# Patient Record
Sex: MALE | Race: BLACK OR AFRICAN AMERICAN | NOT HISPANIC OR LATINO | URBAN - METROPOLITAN AREA
[De-identification: names, ages, dates, MRNs, and addresses within clinical notes are randomized per-mention and may not be internally consistent; named-entity substitution may affect disease eponyms.]

---

## 2018-05-23 ENCOUNTER — OUTPATIENT (OUTPATIENT)
Dept: OUTPATIENT SERVICES | Facility: HOSPITAL | Age: 53
LOS: 1 days | End: 2018-05-23
Payer: COMMERCIAL

## 2018-05-23 VITALS
SYSTOLIC BLOOD PRESSURE: 127 MMHG | TEMPERATURE: 99 F | HEART RATE: 89 BPM | DIASTOLIC BLOOD PRESSURE: 87 MMHG | WEIGHT: 259.93 LBS | HEIGHT: 67 IN | RESPIRATION RATE: 16 BRPM

## 2018-05-23 DIAGNOSIS — Z01.818 ENCOUNTER FOR OTHER PREPROCEDURAL EXAMINATION: ICD-10-CM

## 2018-05-23 DIAGNOSIS — Z98.89 OTHER SPECIFIED POSTPROCEDURAL STATES: Chronic | ICD-10-CM

## 2018-05-23 DIAGNOSIS — G47.30 SLEEP APNEA, UNSPECIFIED: ICD-10-CM

## 2018-05-23 DIAGNOSIS — M17.12 UNILATERAL PRIMARY OSTEOARTHRITIS, LEFT KNEE: ICD-10-CM

## 2018-05-23 DIAGNOSIS — E11.9 TYPE 2 DIABETES MELLITUS WITHOUT COMPLICATIONS: ICD-10-CM

## 2018-05-23 DIAGNOSIS — Z96.659 PRESENCE OF UNSPECIFIED ARTIFICIAL KNEE JOINT: Chronic | ICD-10-CM

## 2018-05-23 DIAGNOSIS — M25.562 PAIN IN LEFT KNEE: ICD-10-CM

## 2018-05-23 DIAGNOSIS — R94.31 ABNORMAL ELECTROCARDIOGRAM [ECG] [EKG]: ICD-10-CM

## 2018-05-23 DIAGNOSIS — Z09 ENCOUNTER FOR FOLLOW-UP EXAMINATION AFTER COMPLETED TREATMENT FOR CONDITIONS OTHER THAN MALIGNANT NEOPLASM: Chronic | ICD-10-CM

## 2018-05-23 LAB
ALBUMIN SERPL ELPH-MCNC: 3.9 G/DL — SIGNIFICANT CHANGE UP (ref 3.3–5)
ALP SERPL-CCNC: 78 U/L — SIGNIFICANT CHANGE UP (ref 30–120)
ALT FLD-CCNC: 57 U/L DA — SIGNIFICANT CHANGE UP (ref 10–60)
ANION GAP SERPL CALC-SCNC: 8 MMOL/L — SIGNIFICANT CHANGE UP (ref 5–17)
APTT BLD: 31.1 SEC — SIGNIFICANT CHANGE UP (ref 27.5–37.4)
AST SERPL-CCNC: 30 U/L — SIGNIFICANT CHANGE UP (ref 10–40)
BILIRUB SERPL-MCNC: 0.3 MG/DL — SIGNIFICANT CHANGE UP (ref 0.2–1.2)
BUN SERPL-MCNC: 13 MG/DL — SIGNIFICANT CHANGE UP (ref 7–23)
CALCIUM SERPL-MCNC: 10.1 MG/DL — SIGNIFICANT CHANGE UP (ref 8.4–10.5)
CHLORIDE SERPL-SCNC: 101 MMOL/L — SIGNIFICANT CHANGE UP (ref 96–108)
CO2 SERPL-SCNC: 29 MMOL/L — SIGNIFICANT CHANGE UP (ref 22–31)
CREAT SERPL-MCNC: 0.84 MG/DL — SIGNIFICANT CHANGE UP (ref 0.5–1.3)
GLUCOSE SERPL-MCNC: 128 MG/DL — HIGH (ref 70–99)
HBA1C BLD-MCNC: 8.6 % — HIGH (ref 4–5.6)
HCT VFR BLD CALC: 42.4 % — SIGNIFICANT CHANGE UP (ref 39–50)
HGB BLD-MCNC: 14.5 G/DL — SIGNIFICANT CHANGE UP (ref 13–17)
INR BLD: 1.01 RATIO — SIGNIFICANT CHANGE UP (ref 0.88–1.16)
MCHC RBC-ENTMCNC: 28.7 PG — SIGNIFICANT CHANGE UP (ref 27–34)
MCHC RBC-ENTMCNC: 34.3 GM/DL — SIGNIFICANT CHANGE UP (ref 32–36)
MCV RBC AUTO: 83.5 FL — SIGNIFICANT CHANGE UP (ref 80–100)
PLATELET # BLD AUTO: 253 K/UL — SIGNIFICANT CHANGE UP (ref 150–400)
POTASSIUM SERPL-MCNC: 4.2 MMOL/L — SIGNIFICANT CHANGE UP (ref 3.5–5.3)
POTASSIUM SERPL-SCNC: 4.2 MMOL/L — SIGNIFICANT CHANGE UP (ref 3.5–5.3)
PROT SERPL-MCNC: 8.4 G/DL — HIGH (ref 6–8.3)
PROTHROM AB SERPL-ACNC: 11 SEC — SIGNIFICANT CHANGE UP (ref 9.8–12.7)
RBC # BLD: 5.07 M/UL — SIGNIFICANT CHANGE UP (ref 4.2–5.8)
RBC # FLD: 11.8 % — SIGNIFICANT CHANGE UP (ref 10.3–14.5)
SODIUM SERPL-SCNC: 138 MMOL/L — SIGNIFICANT CHANGE UP (ref 135–145)
WBC # BLD: 5.4 K/UL — SIGNIFICANT CHANGE UP (ref 3.8–10.5)
WBC # FLD AUTO: 5.4 K/UL — SIGNIFICANT CHANGE UP (ref 3.8–10.5)

## 2018-05-23 PROCEDURE — 86900 BLOOD TYPING SEROLOGIC ABO: CPT

## 2018-05-23 PROCEDURE — G0463: CPT

## 2018-05-23 PROCEDURE — 83036 HEMOGLOBIN GLYCOSYLATED A1C: CPT

## 2018-05-23 PROCEDURE — 86901 BLOOD TYPING SEROLOGIC RH(D): CPT

## 2018-05-23 PROCEDURE — 93010 ELECTROCARDIOGRAM REPORT: CPT | Mod: NC

## 2018-05-23 PROCEDURE — 87640 STAPH A DNA AMP PROBE: CPT

## 2018-05-23 PROCEDURE — 36415 COLL VENOUS BLD VENIPUNCTURE: CPT

## 2018-05-23 PROCEDURE — 85730 THROMBOPLASTIN TIME PARTIAL: CPT

## 2018-05-23 PROCEDURE — 93005 ELECTROCARDIOGRAM TRACING: CPT

## 2018-05-23 PROCEDURE — 80053 COMPREHEN METABOLIC PANEL: CPT

## 2018-05-23 PROCEDURE — 86850 RBC ANTIBODY SCREEN: CPT

## 2018-05-23 PROCEDURE — 85610 PROTHROMBIN TIME: CPT

## 2018-05-23 PROCEDURE — 85027 COMPLETE CBC AUTOMATED: CPT

## 2018-05-23 NOTE — H&P PST ADULT - PMH
Diabetes  rarely does acucheck  100-140  HTN (hypertension)    Knee pain, right    Osteoarthritis  knees  Sleep apnea in adult  sleep study done 5 years ago.  DOES NOT USE CPAP.

## 2018-05-23 NOTE — H&P PST ADULT - ATTENDING COMMENTS
52 year old male with progressively worsening left knee pain. On exam he has pain over the medial and lateral aspects of the knee. XR show advanced medial compartment osteoarthritis and wear in the patellofemoral and lateral compartments. The patient has failed conservative management. I recommend a left total knee arthroplasty. The risks of the procedure, including bleeding, infection, stiffness, damage to muscles / vessels / nerves, pain, and need for additional surgery, were reviewed. The benefits, including decreased pain and improved function, were also reviewed. The patient understands the risks and benefits and wishes to proceed with surgery.

## 2018-05-23 NOTE — H&P PST ADULT - FAMILY HISTORY
Mother  Still living? No  Family history of diabetes mellitus, Age at diagnosis: Age Unknown     Father  Still living? No  Family history of hypertension, Age at diagnosis: Age Unknown  Family history of arrhythmia, Age at diagnosis: Age Unknown

## 2018-05-23 NOTE — H&P PST ADULT - PSH
S/P knee surgery  right knee arthroscopy  S/P total knee replacement  2016, partial, right  S/P umbilical hernia repair, follow-up exam

## 2018-05-23 NOTE — H&P PST ADULT - HISTORY OF PRESENT ILLNESS
this is a 53 y/o male who has had pain left knee for several years; tests show arthritis; to have left knee replacement; to have replacement

## 2018-05-24 LAB
MRSA PCR RESULT.: SIGNIFICANT CHANGE UP
S AUREUS DNA NOSE QL NAA+PROBE: DETECTED

## 2018-05-25 RX ORDER — MUPIROCIN 20 MG/G
1 OINTMENT TOPICAL
Qty: 1 | Refills: 0 | OUTPATIENT
Start: 2018-05-25 | End: 2018-05-29

## 2018-05-25 NOTE — PROVIDER CONTACT NOTE (OTHER) - ASSESSMENT
Patient monitors his glucose and stated his levels have been well under 200.  He has made  lifestyle changes and takes glimepiride 2mg po am and 1mg po dinner, Januvia 100mgpo daily and Metformin 1000mg BID will need insulin while hospitalized

## 2018-05-25 NOTE — PROVIDER CONTACT NOTE (OTHER) - SITUATION
nose culture positive for MSSA. mupirocin ointment 2% escribed and sent to patient's pharmacy. to be used twice a day for 5 consecutive days. called patient and reviewed findings and treatment. he

## 2018-06-05 NOTE — PROVIDER CONTACT NOTE (OTHER) - BACKGROUND
Nasal culture + for MSSA    scheduled for left total knee replacement surgery on 6/12/18
Type 2 with history of hyperglycemia A1c in passed has been as high as 13%.
verbalized an understanding of the treatment protocol.

## 2018-06-05 NOTE — PROVIDER CONTACT NOTE (OTHER) - ACTION/TREATMENT ORDERED:
spoke to pt. who states he finished using Mupirocin. Verbalized that he used 2x/day for a total of 5 days. Reminded to bring checklist to hospital on day of surgery
notified [facility] op team

## 2018-06-06 RX ORDER — CHLORHEXIDINE GLUCONATE 213 G/1000ML
1 SOLUTION TOPICAL ONCE
Qty: 0 | Refills: 0 | Status: COMPLETED | OUTPATIENT
Start: 2018-06-12 | End: 2018-06-12

## 2018-06-11 ENCOUNTER — TRANSCRIPTION ENCOUNTER (OUTPATIENT)
Age: 53
End: 2018-06-11

## 2018-06-11 RX ORDER — DEXTROSE 50 % IN WATER 50 %
15 SYRINGE (ML) INTRAVENOUS ONCE
Qty: 0 | Refills: 0 | Status: DISCONTINUED | OUTPATIENT
Start: 2018-06-12 | End: 2018-06-13

## 2018-06-11 RX ORDER — INSULIN LISPRO 100/ML
VIAL (ML) SUBCUTANEOUS
Qty: 0 | Refills: 0 | Status: DISCONTINUED | OUTPATIENT
Start: 2018-06-12 | End: 2018-06-13

## 2018-06-11 RX ORDER — SODIUM CHLORIDE 9 MG/ML
1000 INJECTION, SOLUTION INTRAVENOUS
Qty: 0 | Refills: 0 | Status: DISCONTINUED | OUTPATIENT
Start: 2018-06-12 | End: 2018-06-13

## 2018-06-11 RX ORDER — MAGNESIUM HYDROXIDE 400 MG/1
30 TABLET, CHEWABLE ORAL DAILY
Qty: 0 | Refills: 0 | Status: DISCONTINUED | OUTPATIENT
Start: 2018-06-12 | End: 2018-06-13

## 2018-06-11 RX ORDER — DEXTROSE 50 % IN WATER 50 %
25 SYRINGE (ML) INTRAVENOUS ONCE
Qty: 0 | Refills: 0 | Status: DISCONTINUED | OUTPATIENT
Start: 2018-06-12 | End: 2018-06-13

## 2018-06-11 RX ORDER — ONDANSETRON 8 MG/1
4 TABLET, FILM COATED ORAL EVERY 6 HOURS
Qty: 0 | Refills: 0 | Status: DISCONTINUED | OUTPATIENT
Start: 2018-06-12 | End: 2018-06-13

## 2018-06-11 RX ORDER — GLUCAGON INJECTION, SOLUTION 0.5 MG/.1ML
1 INJECTION, SOLUTION SUBCUTANEOUS ONCE
Qty: 0 | Refills: 0 | Status: DISCONTINUED | OUTPATIENT
Start: 2018-06-12 | End: 2018-06-13

## 2018-06-11 RX ORDER — POLYETHYLENE GLYCOL 3350 17 G/17G
17 POWDER, FOR SOLUTION ORAL DAILY
Qty: 0 | Refills: 0 | Status: DISCONTINUED | OUTPATIENT
Start: 2018-06-12 | End: 2018-06-13

## 2018-06-11 RX ORDER — DOCUSATE SODIUM 100 MG
100 CAPSULE ORAL THREE TIMES A DAY
Qty: 0 | Refills: 0 | Status: DISCONTINUED | OUTPATIENT
Start: 2018-06-12 | End: 2018-06-13

## 2018-06-11 RX ORDER — DEXTROSE 50 % IN WATER 50 %
12.5 SYRINGE (ML) INTRAVENOUS ONCE
Qty: 0 | Refills: 0 | Status: DISCONTINUED | OUTPATIENT
Start: 2018-06-12 | End: 2018-06-13

## 2018-06-11 RX ORDER — SENNA PLUS 8.6 MG/1
2 TABLET ORAL AT BEDTIME
Qty: 0 | Refills: 0 | Status: DISCONTINUED | OUTPATIENT
Start: 2018-06-12 | End: 2018-06-13

## 2018-06-11 RX ORDER — PANTOPRAZOLE SODIUM 20 MG/1
40 TABLET, DELAYED RELEASE ORAL DAILY
Qty: 0 | Refills: 0 | Status: DISCONTINUED | OUTPATIENT
Start: 2018-06-12 | End: 2018-06-13

## 2018-06-11 NOTE — PATIENT PROFILE ADULT. - TEACHING/LEARNING LEARNING PREFERENCES
pictorial/skill demonstration/written material/verbal instruction/group instruction/individual instruction/video

## 2018-06-12 ENCOUNTER — RESULT REVIEW (OUTPATIENT)
Age: 53
End: 2018-06-12

## 2018-06-12 ENCOUNTER — INPATIENT (INPATIENT)
Facility: HOSPITAL | Age: 53
LOS: 0 days | Discharge: HOME | DRG: 470 | End: 2018-06-13
Attending: ORTHOPAEDIC SURGERY | Admitting: ORTHOPAEDIC SURGERY
Payer: COMMERCIAL

## 2018-06-12 VITALS — WEIGHT: 253.09 LBS | HEIGHT: 66 IN

## 2018-06-12 DIAGNOSIS — Z01.818 ENCOUNTER FOR OTHER PREPROCEDURAL EXAMINATION: ICD-10-CM

## 2018-06-12 DIAGNOSIS — Z09 ENCOUNTER FOR FOLLOW-UP EXAMINATION AFTER COMPLETED TREATMENT FOR CONDITIONS OTHER THAN MALIGNANT NEOPLASM: Chronic | ICD-10-CM

## 2018-06-12 DIAGNOSIS — M17.12 UNILATERAL PRIMARY OSTEOARTHRITIS, LEFT KNEE: ICD-10-CM

## 2018-06-12 DIAGNOSIS — G47.30 SLEEP APNEA, UNSPECIFIED: ICD-10-CM

## 2018-06-12 DIAGNOSIS — Z96.659 PRESENCE OF UNSPECIFIED ARTIFICIAL KNEE JOINT: Chronic | ICD-10-CM

## 2018-06-12 DIAGNOSIS — Z98.89 OTHER SPECIFIED POSTPROCEDURAL STATES: Chronic | ICD-10-CM

## 2018-06-12 DIAGNOSIS — E11.9 TYPE 2 DIABETES MELLITUS WITHOUT COMPLICATIONS: ICD-10-CM

## 2018-06-12 DIAGNOSIS — I10 ESSENTIAL (PRIMARY) HYPERTENSION: ICD-10-CM

## 2018-06-12 LAB
ANION GAP SERPL CALC-SCNC: 12 MMOL/L — SIGNIFICANT CHANGE UP (ref 5–17)
BUN SERPL-MCNC: 12 MG/DL — SIGNIFICANT CHANGE UP (ref 7–23)
CALCIUM SERPL-MCNC: 9.6 MG/DL — SIGNIFICANT CHANGE UP (ref 8.4–10.5)
CHLORIDE SERPL-SCNC: 98 MMOL/L — SIGNIFICANT CHANGE UP (ref 96–108)
CO2 SERPL-SCNC: 24 MMOL/L — SIGNIFICANT CHANGE UP (ref 22–31)
CREAT SERPL-MCNC: 0.97 MG/DL — SIGNIFICANT CHANGE UP (ref 0.5–1.3)
GLUCOSE SERPL-MCNC: 203 MG/DL — HIGH (ref 70–99)
HCT VFR BLD CALC: 37.3 % — LOW (ref 39–50)
HGB BLD-MCNC: 12.6 G/DL — LOW (ref 13–17)
POTASSIUM SERPL-MCNC: 4.2 MMOL/L — SIGNIFICANT CHANGE UP (ref 3.5–5.3)
POTASSIUM SERPL-SCNC: 4.2 MMOL/L — SIGNIFICANT CHANGE UP (ref 3.5–5.3)
SODIUM SERPL-SCNC: 134 MMOL/L — LOW (ref 135–145)

## 2018-06-12 PROCEDURE — 88311 DECALCIFY TISSUE: CPT | Mod: 26

## 2018-06-12 PROCEDURE — 73562 X-RAY EXAM OF KNEE 3: CPT | Mod: 26,LT

## 2018-06-12 PROCEDURE — 88305 TISSUE EXAM BY PATHOLOGIST: CPT | Mod: 26

## 2018-06-12 RX ORDER — ACETAMINOPHEN 500 MG
1000 TABLET ORAL EVERY 6 HOURS
Qty: 0 | Refills: 0 | Status: COMPLETED | OUTPATIENT
Start: 2018-06-12 | End: 2018-06-13

## 2018-06-12 RX ORDER — CELECOXIB 200 MG/1
200 CAPSULE ORAL
Qty: 0 | Refills: 0 | Status: DISCONTINUED | OUTPATIENT
Start: 2018-06-13 | End: 2018-06-13

## 2018-06-12 RX ORDER — FENTANYL CITRATE 50 UG/ML
25 INJECTION INTRAVENOUS
Qty: 0 | Refills: 0 | Status: DISCONTINUED | OUTPATIENT
Start: 2018-06-12 | End: 2018-06-12

## 2018-06-12 RX ORDER — CEFAZOLIN SODIUM 1 G
2000 VIAL (EA) INJECTION ONCE
Qty: 0 | Refills: 0 | Status: COMPLETED | OUTPATIENT
Start: 2018-06-12 | End: 2018-06-12

## 2018-06-12 RX ORDER — TRANEXAMIC ACID 100 MG/ML
1000 INJECTION, SOLUTION INTRAVENOUS ONCE
Qty: 0 | Refills: 0 | Status: COMPLETED | OUTPATIENT
Start: 2018-06-12 | End: 2018-06-12

## 2018-06-12 RX ORDER — SODIUM CHLORIDE 9 MG/ML
1000 INJECTION, SOLUTION INTRAVENOUS
Qty: 0 | Refills: 0 | Status: DISCONTINUED | OUTPATIENT
Start: 2018-06-12 | End: 2018-06-12

## 2018-06-12 RX ORDER — ASPIRIN/CALCIUM CARB/MAGNESIUM 324 MG
2 TABLET ORAL
Qty: 164 | Refills: 0 | OUTPATIENT
Start: 2018-06-12 | End: 2018-07-22

## 2018-06-12 RX ORDER — ACETAMINOPHEN 500 MG
1000 TABLET ORAL ONCE
Qty: 0 | Refills: 0 | Status: COMPLETED | OUTPATIENT
Start: 2018-06-12 | End: 2018-06-12

## 2018-06-12 RX ORDER — DIPHENHYDRAMINE HCL 50 MG
25 CAPSULE ORAL ONCE
Qty: 0 | Refills: 0 | Status: DISCONTINUED | OUTPATIENT
Start: 2018-06-12 | End: 2018-06-13

## 2018-06-12 RX ORDER — ASPIRIN/CALCIUM CARB/MAGNESIUM 324 MG
162 TABLET ORAL EVERY 12 HOURS
Qty: 0 | Refills: 0 | Status: DISCONTINUED | OUTPATIENT
Start: 2018-06-13 | End: 2018-06-13

## 2018-06-12 RX ORDER — OXYCODONE HYDROCHLORIDE 5 MG/1
5 TABLET ORAL EVERY 4 HOURS
Qty: 0 | Refills: 0 | Status: DISCONTINUED | OUTPATIENT
Start: 2018-06-12 | End: 2018-06-12

## 2018-06-12 RX ORDER — AMLODIPINE BESYLATE 2.5 MG/1
10 TABLET ORAL DAILY
Qty: 0 | Refills: 0 | Status: DISCONTINUED | OUTPATIENT
Start: 2018-06-12 | End: 2018-06-13

## 2018-06-12 RX ORDER — ACETAMINOPHEN 500 MG
1000 TABLET ORAL EVERY 8 HOURS
Qty: 0 | Refills: 0 | Status: DISCONTINUED | OUTPATIENT
Start: 2018-06-13 | End: 2018-06-13

## 2018-06-12 RX ORDER — IPRATROPIUM/ALBUTEROL SULFATE 18-103MCG
3 AEROSOL WITH ADAPTER (GRAM) INHALATION EVERY 6 HOURS
Qty: 0 | Refills: 0 | Status: DISCONTINUED | OUTPATIENT
Start: 2018-06-12 | End: 2018-06-12

## 2018-06-12 RX ORDER — METFORMIN HYDROCHLORIDE 850 MG/1
1000 TABLET ORAL
Qty: 0 | Refills: 0 | Status: DISCONTINUED | OUTPATIENT
Start: 2018-06-13 | End: 2018-06-13

## 2018-06-12 RX ORDER — HYDROMORPHONE HYDROCHLORIDE 2 MG/ML
0.5 INJECTION INTRAMUSCULAR; INTRAVENOUS; SUBCUTANEOUS
Qty: 0 | Refills: 0 | Status: DISCONTINUED | OUTPATIENT
Start: 2018-06-12 | End: 2018-06-13

## 2018-06-12 RX ORDER — OXYCODONE HYDROCHLORIDE 5 MG/1
10 TABLET ORAL
Qty: 0 | Refills: 0 | Status: DISCONTINUED | OUTPATIENT
Start: 2018-06-12 | End: 2018-06-13

## 2018-06-12 RX ORDER — ONDANSETRON 8 MG/1
4 TABLET, FILM COATED ORAL ONCE
Qty: 0 | Refills: 0 | Status: DISCONTINUED | OUTPATIENT
Start: 2018-06-12 | End: 2018-06-12

## 2018-06-12 RX ORDER — DOCUSATE SODIUM 100 MG
1 CAPSULE ORAL
Qty: 0 | Refills: 0 | COMMUNITY
Start: 2018-06-12

## 2018-06-12 RX ORDER — POLYETHYLENE GLYCOL 3350 17 G/17G
17 POWDER, FOR SOLUTION ORAL
Qty: 0 | Refills: 0 | COMMUNITY
Start: 2018-06-12

## 2018-06-12 RX ORDER — ACETAMINOPHEN 500 MG
2 TABLET ORAL
Qty: 0 | Refills: 0 | COMMUNITY
Start: 2018-06-12 | End: 2018-06-26

## 2018-06-12 RX ORDER — SENNA PLUS 8.6 MG/1
2 TABLET ORAL
Qty: 0 | Refills: 0 | COMMUNITY
Start: 2018-06-12

## 2018-06-12 RX ORDER — OXYCODONE HYDROCHLORIDE 5 MG/1
5 TABLET ORAL
Qty: 0 | Refills: 0 | Status: DISCONTINUED | OUTPATIENT
Start: 2018-06-12 | End: 2018-06-13

## 2018-06-12 RX ORDER — PANTOPRAZOLE SODIUM 20 MG/1
1 TABLET, DELAYED RELEASE ORAL
Qty: 41 | Refills: 0 | OUTPATIENT
Start: 2018-06-12 | End: 2018-07-22

## 2018-06-12 RX ORDER — CELECOXIB 200 MG/1
1 CAPSULE ORAL
Qty: 42 | Refills: 1 | OUTPATIENT
Start: 2018-06-12 | End: 2018-07-23

## 2018-06-12 RX ORDER — CEFAZOLIN SODIUM 1 G
2000 VIAL (EA) INJECTION EVERY 8 HOURS
Qty: 0 | Refills: 0 | Status: COMPLETED | OUTPATIENT
Start: 2018-06-12 | End: 2018-06-13

## 2018-06-12 RX ORDER — APREPITANT 80 MG/1
40 CAPSULE ORAL ONCE
Qty: 0 | Refills: 0 | Status: COMPLETED | OUTPATIENT
Start: 2018-06-12 | End: 2018-06-12

## 2018-06-12 RX ADMIN — Medication 100 MILLIGRAM(S): at 19:26

## 2018-06-12 RX ADMIN — Medication 1000 MILLIGRAM(S): at 22:07

## 2018-06-12 RX ADMIN — HYDROMORPHONE HYDROCHLORIDE 0.5 MILLIGRAM(S): 2 INJECTION INTRAMUSCULAR; INTRAVENOUS; SUBCUTANEOUS at 22:23

## 2018-06-12 RX ADMIN — HYDROMORPHONE HYDROCHLORIDE 0.5 MILLIGRAM(S): 2 INJECTION INTRAMUSCULAR; INTRAVENOUS; SUBCUTANEOUS at 22:53

## 2018-06-12 RX ADMIN — APREPITANT 40 MILLIGRAM(S): 80 CAPSULE ORAL at 11:43

## 2018-06-12 RX ADMIN — SODIUM CHLORIDE 50 MILLILITER(S): 9 INJECTION, SOLUTION INTRAVENOUS at 15:48

## 2018-06-12 RX ADMIN — Medication 400 MILLIGRAM(S): at 20:07

## 2018-06-12 RX ADMIN — CHLORHEXIDINE GLUCONATE 1 APPLICATION(S): 213 SOLUTION TOPICAL at 10:59

## 2018-06-12 RX ADMIN — Medication 100 MILLIGRAM(S): at 22:23

## 2018-06-12 NOTE — PHYSICAL THERAPY INITIAL EVALUATION ADULT - ADDITIONAL COMMENTS
Pt lives with family in a house w/ 3-4 steps to enter with B wide rails.  Pt has 1 flight of steps to bedroom with right rail

## 2018-06-12 NOTE — BRIEF OPERATIVE NOTE - PROCEDURE
<<-----Click on this checkbox to enter Procedure Left total knee arthroplasty  06/12/2018    Active  Smith Larsen

## 2018-06-12 NOTE — PROGRESS NOTE ADULT - SUBJECTIVE AND OBJECTIVE BOX
Orthopaedic Post Op Note    Procedure: Left TKR  Surgeon: Constantin Fletcher    52y Male comfortable, without complaints. Reported pain score = 3 Left foot "fell asleep," but is coming back.  Denies N/V, CP, SOB.    PE:  Vital Signs Last 24 Hrs  T(C): 36.7 (12 Jun 2018 18:45), Max: 37.3 (12 Jun 2018 14:57)  T(F): 98.1 (12 Jun 2018 18:45), Max: 99.1 (12 Jun 2018 14:57)  HR: 90 (12 Jun 2018 18:45) (74 - 92)  BP: 121/81 (12 Jun 2018 18:45) (98/70 - 154/55)  RR: 16 (12 Jun 2018 18:45) (12 - 24)  SpO2: 100% (12 Jun 2018 18:45) (94% - 100%)  General: Pt alert and oriented   Lungs: + BS CTA bilaterally  Heart: +S1 & S2 heard, RRR  Abd: + BS heard, soft, NT, ND  Left Knee Dressing: C/D/I, functioning hemovac in place  Bilateral LEs:  Motor:   5/5 dorsiflexion, plantarflexion, EHL  Sensation intact to LT   2+ DP Pulses    A/P: 52y Male POD#0 s/p Left TKR  - Stable  - Acetaminophen, Celebrex, Dilaudid/Oxycodone for Pain Control   - DVT ppx: Aspirin  - Mirella op IV abx: Ancef   - PT, OT per protocol  - F/U AM Labs  DCP = home Thurs

## 2018-06-12 NOTE — PHYSICAL THERAPY INITIAL EVALUATION ADULT - RANGE OF MOTION EXAMINATION, REHAB EVAL
bilateral upper extremity ROM was WFL (within functional limits)/deficits as listed below/L knee 0-60 deg

## 2018-06-12 NOTE — PHYSICAL THERAPY INITIAL EVALUATION ADULT - BED MOBILITY TRAINING, PT EVAL
Goals 3-5 sessions: pt will perform bed mobility independently.    Pt will negotiate 12 steps with right rail and straight cane with supervision

## 2018-06-13 ENCOUNTER — TRANSCRIPTION ENCOUNTER (OUTPATIENT)
Age: 53
End: 2018-06-13

## 2018-06-13 VITALS
TEMPERATURE: 98 F | DIASTOLIC BLOOD PRESSURE: 79 MMHG | OXYGEN SATURATION: 99 % | HEART RATE: 107 BPM | SYSTOLIC BLOOD PRESSURE: 127 MMHG | RESPIRATION RATE: 14 BRPM

## 2018-06-13 DIAGNOSIS — E11.8 TYPE 2 DIABETES MELLITUS WITH UNSPECIFIED COMPLICATIONS: ICD-10-CM

## 2018-06-13 DIAGNOSIS — Z29.9 ENCOUNTER FOR PROPHYLACTIC MEASURES, UNSPECIFIED: ICD-10-CM

## 2018-06-13 DIAGNOSIS — I10 ESSENTIAL (PRIMARY) HYPERTENSION: ICD-10-CM

## 2018-06-13 LAB
ANION GAP SERPL CALC-SCNC: 8 MMOL/L — SIGNIFICANT CHANGE UP (ref 5–17)
BUN SERPL-MCNC: 11 MG/DL — SIGNIFICANT CHANGE UP (ref 7–23)
CALCIUM SERPL-MCNC: 9.7 MG/DL — SIGNIFICANT CHANGE UP (ref 8.4–10.5)
CHLORIDE SERPL-SCNC: 100 MMOL/L — SIGNIFICANT CHANGE UP (ref 96–108)
CO2 SERPL-SCNC: 26 MMOL/L — SIGNIFICANT CHANGE UP (ref 22–31)
CREAT SERPL-MCNC: 0.98 MG/DL — SIGNIFICANT CHANGE UP (ref 0.5–1.3)
GLUCOSE SERPL-MCNC: 133 MG/DL — HIGH (ref 70–99)
HCT VFR BLD CALC: 34.3 % — LOW (ref 39–50)
HGB BLD-MCNC: 11.5 G/DL — LOW (ref 13–17)
MCHC RBC-ENTMCNC: 28 PG — SIGNIFICANT CHANGE UP (ref 27–34)
MCHC RBC-ENTMCNC: 33.6 GM/DL — SIGNIFICANT CHANGE UP (ref 32–36)
MCV RBC AUTO: 83.5 FL — SIGNIFICANT CHANGE UP (ref 80–100)
PLATELET # BLD AUTO: 232 K/UL — SIGNIFICANT CHANGE UP (ref 150–400)
POTASSIUM SERPL-MCNC: 4 MMOL/L — SIGNIFICANT CHANGE UP (ref 3.5–5.3)
POTASSIUM SERPL-SCNC: 4 MMOL/L — SIGNIFICANT CHANGE UP (ref 3.5–5.3)
RBC # BLD: 4.1 M/UL — LOW (ref 4.2–5.8)
RBC # FLD: 11.4 % — SIGNIFICANT CHANGE UP (ref 10.3–14.5)
SODIUM SERPL-SCNC: 134 MMOL/L — LOW (ref 135–145)
WBC # BLD: 8.5 K/UL — SIGNIFICANT CHANGE UP (ref 3.8–10.5)
WBC # FLD AUTO: 8.5 K/UL — SIGNIFICANT CHANGE UP (ref 3.8–10.5)

## 2018-06-13 PROCEDURE — 97165 OT EVAL LOW COMPLEX 30 MIN: CPT

## 2018-06-13 PROCEDURE — 82962 GLUCOSE BLOOD TEST: CPT

## 2018-06-13 PROCEDURE — 97530 THERAPEUTIC ACTIVITIES: CPT

## 2018-06-13 PROCEDURE — 94660 CPAP INITIATION&MGMT: CPT

## 2018-06-13 PROCEDURE — 80048 BASIC METABOLIC PNL TOTAL CA: CPT

## 2018-06-13 PROCEDURE — 97116 GAIT TRAINING THERAPY: CPT

## 2018-06-13 PROCEDURE — 97535 SELF CARE MNGMENT TRAINING: CPT

## 2018-06-13 PROCEDURE — 85014 HEMATOCRIT: CPT

## 2018-06-13 PROCEDURE — 94664 DEMO&/EVAL PT USE INHALER: CPT

## 2018-06-13 PROCEDURE — 88305 TISSUE EXAM BY PATHOLOGIST: CPT

## 2018-06-13 PROCEDURE — 73562 X-RAY EXAM OF KNEE 3: CPT

## 2018-06-13 PROCEDURE — C1776: CPT

## 2018-06-13 PROCEDURE — 97161 PT EVAL LOW COMPLEX 20 MIN: CPT

## 2018-06-13 PROCEDURE — 88311 DECALCIFY TISSUE: CPT

## 2018-06-13 PROCEDURE — 85018 HEMOGLOBIN: CPT

## 2018-06-13 PROCEDURE — 36415 COLL VENOUS BLD VENIPUNCTURE: CPT

## 2018-06-13 PROCEDURE — 97110 THERAPEUTIC EXERCISES: CPT

## 2018-06-13 PROCEDURE — C1713: CPT

## 2018-06-13 PROCEDURE — 85027 COMPLETE CBC AUTOMATED: CPT

## 2018-06-13 RX ORDER — OXYCODONE HYDROCHLORIDE 5 MG/1
1 TABLET ORAL
Qty: 0 | Refills: 0 | COMMUNITY
Start: 2018-06-13

## 2018-06-13 RX ORDER — GLIMEPIRIDE 1 MG
1 TABLET ORAL
Qty: 0 | Refills: 0 | COMMUNITY

## 2018-06-13 RX ORDER — SITAGLIPTIN 50 MG/1
1 TABLET, FILM COATED ORAL
Qty: 0 | Refills: 0 | COMMUNITY

## 2018-06-13 RX ORDER — OXYCODONE HYDROCHLORIDE 5 MG/1
1 TABLET ORAL
Qty: 42 | Refills: 0 | OUTPATIENT
Start: 2018-06-13 | End: 2018-06-19

## 2018-06-13 RX ORDER — METFORMIN HYDROCHLORIDE 850 MG/1
1 TABLET ORAL
Qty: 0 | Refills: 0 | COMMUNITY

## 2018-06-13 RX ORDER — AMLODIPINE BESYLATE 2.5 MG/1
1 TABLET ORAL
Qty: 0 | Refills: 0 | COMMUNITY

## 2018-06-13 RX ORDER — SENNA PLUS 8.6 MG/1
2 TABLET ORAL
Qty: 14 | Refills: 0 | OUTPATIENT
Start: 2018-06-13 | End: 2018-06-19

## 2018-06-13 RX ORDER — POLYETHYLENE GLYCOL 3350 17 G/17G
17 POWDER, FOR SOLUTION ORAL
Qty: 119 | Refills: 0 | OUTPATIENT
Start: 2018-06-13 | End: 2018-06-19

## 2018-06-13 RX ORDER — DOCUSATE SODIUM 100 MG
1 CAPSULE ORAL
Qty: 21 | Refills: 0 | OUTPATIENT
Start: 2018-06-13 | End: 2018-06-19

## 2018-06-13 RX ADMIN — CELECOXIB 200 MILLIGRAM(S): 200 CAPSULE ORAL at 16:35

## 2018-06-13 RX ADMIN — Medication 100 MILLIGRAM(S): at 04:56

## 2018-06-13 RX ADMIN — Medication 1000 MILLIGRAM(S): at 11:15

## 2018-06-13 RX ADMIN — Medication 162 MILLIGRAM(S): at 09:00

## 2018-06-13 RX ADMIN — Medication 400 MILLIGRAM(S): at 08:26

## 2018-06-13 RX ADMIN — Medication 1000 MILLIGRAM(S): at 05:55

## 2018-06-13 RX ADMIN — PANTOPRAZOLE SODIUM 40 MILLIGRAM(S): 20 TABLET, DELAYED RELEASE ORAL at 11:39

## 2018-06-13 RX ADMIN — Medication 1000 MILLIGRAM(S): at 16:35

## 2018-06-13 RX ADMIN — CELECOXIB 200 MILLIGRAM(S): 200 CAPSULE ORAL at 11:15

## 2018-06-13 RX ADMIN — METFORMIN HYDROCHLORIDE 1000 MILLIGRAM(S): 850 TABLET ORAL at 08:26

## 2018-06-13 RX ADMIN — AMLODIPINE BESYLATE 10 MILLIGRAM(S): 2.5 TABLET ORAL at 04:56

## 2018-06-13 RX ADMIN — CELECOXIB 200 MILLIGRAM(S): 200 CAPSULE ORAL at 16:36

## 2018-06-13 RX ADMIN — HYDROMORPHONE HYDROCHLORIDE 0.5 MILLIGRAM(S): 2 INJECTION INTRAMUSCULAR; INTRAVENOUS; SUBCUTANEOUS at 04:56

## 2018-06-13 RX ADMIN — Medication 100 MILLIGRAM(S): at 03:12

## 2018-06-13 RX ADMIN — Medication 100 MILLIGRAM(S): at 14:33

## 2018-06-13 RX ADMIN — HYDROMORPHONE HYDROCHLORIDE 0.5 MILLIGRAM(S): 2 INJECTION INTRAMUSCULAR; INTRAVENOUS; SUBCUTANEOUS at 05:26

## 2018-06-13 RX ADMIN — CELECOXIB 200 MILLIGRAM(S): 200 CAPSULE ORAL at 08:26

## 2018-06-13 RX ADMIN — Medication 400 MILLIGRAM(S): at 03:12

## 2018-06-13 NOTE — OCCUPATIONAL THERAPY INITIAL EVALUATION ADULT - ADDITIONAL COMMENTS
3-4 LEXI 2 wide HRs, 1 flight 1 HR to bedroom & stall shower. + 1/2 bath 1st floor. No DME Pt lives in a house with 3-4 steps to enter with 2 wide handrails. 1 flight with a handrail to bedroom & stall shower. + 1/2 bath 1st floor. Pt owns a rolling walker, cane and commode. Pt reports that his family can assist him upon d/c home prn. Pt lives in a house with 3-4 steps to enter with 2 wide handrails. 1 flight with a handrail to bedroom & stall shower. + 1/2 bath 1st floor. Pt owns a rolling walker, cane, shower chair with back and commode. Pt reports that his family can assist him upon d/c home prn.

## 2018-06-13 NOTE — DISCHARGE NOTE ADULT - PLAN OF CARE
return to activities of daily living Physical Therapy/Occupational Therapy for: ambulation, transfers, stairs, ADL's (activities of daily living), range of motion exercises, and isometrics  -Activity  • Weight Bearing as tolerated with rolling walker.  • Take short, frequent walks increasing the distance that you walk each day as tolerated.  • Change your position every hour to decrease pain and stiffness.  • Continue the exercises taught to you by your physical therapist.  • No driving until cleared by the doctor.  • No tub baths, hot tubs, or swimming pools until instructed by your doctor.  • Do not squat down on the floor.  • Do not kneel or twist your knee.  • Range of Motion Goals: Flexion= 120 degrees, Extension = 0 degrees  Keep incision clean and dry. May shower 5 days after surgery if no drainage from incision.  Suture removal 2 weeks after surgery at Surgeon's office.    Call Dr. Nelson' office [phone] to make an appointment to be seen within 7-10 days. [facility] - [address] [place] [address] - Call your doctor if you experience:  • An increase in pain not controlled by pain medication or change in activity or  position.  • Temperature greater than 101° F.  • Redness, increased swelling or foul smelling drainage from or around the  incision.  • Numbness, tingling or a change in color or temperature of the operative leg.  • Call your doctor immediately if you experience chest pain, shortness of breath or calf pain.

## 2018-06-13 NOTE — CONSULT NOTE ADULT - PROBLEM SELECTOR PROBLEM 3
HTN (hypertension)
Type 2 diabetes mellitus with complication, without long-term current use of insulin

## 2018-06-13 NOTE — DISCHARGE NOTE ADULT - CARE PROVIDER_API CALL
Constantin Fletcher), Orthopaedic Surgery  [address] [place] [address]  Phone: [phone]  Fax: [phone]

## 2018-06-13 NOTE — DISCHARGE NOTE ADULT - MEDICATION SUMMARY - MEDICATIONS TO TAKE
I will START or STAY ON the medications listed below when I get home from the hospital:    acetaminophen 500 mg oral tablet  -- 2 tab(s) by mouth every 8 hours  -- Indication: For Pain    celecoxib 200 mg oral capsule  -- 1 cap(s) by mouth 2 times a day  -- Indication: For Pain    aspirin 81 mg oral delayed release tablet  -- 2 tab(s) by mouth every 12 hours  -- Indication: For Prevent blood clots    oxyCODONE 5 mg oral tablet  -- 1 tab(s) by mouth every 4 hours, As Needed -Mild Pain (1 - 3) MDD:6  -- Indication: For Pain    metFORMIN 1000 mg oral tablet, extended release  -- 1 tab(s) by mouth 2 times a day  -- Indication: For Diabetes    Januvia 100 mg oral tablet  -- 1 tab(s) by mouth once a day  -- Indication: For Diabetes    glimepiride 2 mg oral tablet  -- 1 tab(s) by mouth once a day  -- Indication: For Diabetes    amLODIPine 10 mg oral tablet  -- 1 tab(s) by mouth once a day  -- Indication: For blood pressure    senna oral tablet  -- 2 tab(s) by mouth once a day (at bedtime) MDD:2  -- Indication: For Prevent constipation    polyethylene glycol 3350 oral powder for reconstitution  -- 17 gram(s) by mouth once a day, As needed, Constipation MDD:34  -- Indication: For Prevent constipation    docusate sodium 100 mg oral capsule  -- 1 cap(s) by mouth 3 times a day MDD:3  -- Indication: For Prevent constipation    pantoprazole 40 mg oral delayed release tablet  -- 1 tab(s) by mouth once a day  -- Indication: For Prevent constipation

## 2018-06-13 NOTE — OCCUPATIONAL THERAPY INITIAL EVALUATION ADULT - GENERAL OBSERVATIONS, REHAB EVAL
Pt found supine in bed with +IV, PAS, +bandage left knee, supplemental 02 Pt found supine in bed with +IV, +hemovac/+bandage left knee, +telemonitor, supplemental 02.

## 2018-06-13 NOTE — OCCUPATIONAL THERAPY INITIAL EVALUATION ADULT - PATIENT/FAMILY/SIGNIFICANT OTHER GOALS STATEMENT, OT EVAL
Pt. would like to go home upon d/c from [facility]. Pt. would like to go home today upon d/c from [facility]. Pt reports his family will assist him at home prn.

## 2018-06-13 NOTE — PROGRESS NOTE ADULT - SUBJECTIVE AND OBJECTIVE BOX
POD#:  1  S/P: Left  TKR                       SUBJECTIVE: Patient seen and examined.  Patient with no complaints at this time. Pain controlled. Denies any fevers, chills, numbness or weakness  Reported Pain Score = 4/10    OBJECTIVE:     Vital Signs Last 24 Hrs  T(C): 36.8 (13 Jun 2018 07:25), Max: 37.3 (12 Jun 2018 14:57)  T(F): 98.3 (13 Jun 2018 07:25), Max: 99.1 (12 Jun 2018 14:57)  HR: 97 (13 Jun 2018 07:25) (74 - 97)  BP: 133/80 (13 Jun 2018 07:25) (98/70 - 154/55)  BP(mean): --  RR: 18 (13 Jun 2018 07:25) (12 - 24)  SpO2: 100% (13 Jun 2018 07:25) (94% - 100%)    I/O- hemovac- 440cc last 12 hours    Left Knee:          Dressing: clean/dry/intact , hemovac in place   Bilateral LEs:         Sensation:  intact to light touch          Motor exam:  /5 dorsiflexion/plantarflexion/EHL          2+ DP pulses          calf supple, NT    LABS:                        11.5   8.5   )-----------( 232      ( 13 Jun 2018 07:59 )             34.3     06-13    134<L>  |  100  |  11  ----------------------------<  133<H>  4.0   |  26  |  0.98    Ca    9.7      13 Jun 2018 07:59            MEDICATIONS:  Anticoagulation:  aspirin enteric coated 162 milliGRAM(s) Oral every 12 hours      Pain medications:   acetaminophen   Tablet. 1000 milliGRAM(s) Oral every 8 hours  celecoxib 200 milliGRAM(s) Oral two times a day  diphenhydrAMINE   Capsule 25 milliGRAM(s) Oral once PRN  HYDROmorphone  Injectable 0.5 milliGRAM(s) IV Push every 3 hours PRN  ondansetron Injectable 4 milliGRAM(s) IV Push every 6 hours PRN  oxyCODONE    IR 5 milliGRAM(s) Oral every 3 hours PRN  oxyCODONE    IR 10 milliGRAM(s) Oral every 3 hours PRN        A/P :  s/p Left  TKR   POD # 1, doing well  -    Pain control  -    remove hemovac later today if ok with Dr. Fletcher , will keep current aquacell in place  -    DVT ppx:   -    Weight bearing status: WBAT   -    Physical Therapy  -    Occupational Therapy  -    Discharge plan:  home

## 2018-06-13 NOTE — DISCHARGE NOTE ADULT - HOSPITAL COURSE
this is a 51 y/o male who has had pain left knee for several years; tests show arthritis; to have left knee replacement; to have replacement. Patient underwent Left total knee arthroplasty on 06/12/2018 with EBL of 125cc's in total. Patient seen by pulmonology for history of CHARMAINE but uncompliant with CPAP at home. Patient seen by PT and OT with recommendations of discharge to home with home PT. Patient doing well post operatively, drain removed at bedside, patients pain is controlled. To be discharged home.

## 2018-06-13 NOTE — PROGRESS NOTE ADULT - SUBJECTIVE AND OBJECTIVE BOX
Discharge medication calendar:  ASA EC 162mg q12h x 6 weeks  APAP 1000mg q8h x 2-3 weeks  Celecoxib 200mg q12h x 2-3 weeks  Pantoprazole 40mg QAM x 6 weeks  Narcotic PRN  Docusate 100mg TID while taking narcotic  Miralax, Senna, or Bisacodyl PRN for treatment of constipation

## 2018-06-13 NOTE — DISCHARGE NOTE ADULT - ADDITIONAL INSTRUCTIONS
Suture removal 2 weeks after surgery at Surgeon's office.    Call Dr. Nelson' office [phone] to make an appointment to be seen within 7-10 days. [facility] - [address] [place] [address]

## 2018-06-13 NOTE — PROGRESS NOTE ADULT - SUBJECTIVE AND OBJECTIVE BOX
PULMONARY/CRITICAL CARE      INTERVAL HPI/OVERNIGHT EVENTS:    52y MaleHPI:  Pt. stable, ambulated. Used O2 at nite.      PAST MEDICAL & SURGICAL HISTORY:  Osteoarthritis: knees  HTN (hypertension)  Knee pain, right  Sleep apnea in adult: sleep study done 5 years ago.  DOES NOT USE CPAP.  Diabetes: rarely does acucheck  100-140  S/P total knee replacement: 2016, partial, right  S/P umbilical hernia repair, follow-up exam  S/P knee surgery: right knee arthroscopy        ICU Vital Signs Last 24 Hrs  T(C): 36.8 (13 Jun 2018 07:25), Max: 37.3 (12 Jun 2018 14:57)  T(F): 98.3 (13 Jun 2018 07:25), Max: 99.1 (12 Jun 2018 14:57)  HR: 97 (13 Jun 2018 07:25) (74 - 97)  BP: 133/80 (13 Jun 2018 07:25) (98/70 - 154/55)  BP(mean): --  ABP: --  ABP(mean): --  RR: 18 (13 Jun 2018 07:25) (12 - 24)  SpO2: 100% (13 Jun 2018 07:25) (94% - 100%)    Qtts:     I&O's Summary    12 Jun 2018 07:01  -  13 Jun 2018 07:00  --------------------------------------------------------  IN: 2475 mL / OUT: 2410 mL / NET: 65 mL            REVIEW OF SYSTEMS:    CONSTITUTIONAL: No fever, weight loss, or fatigue  EYES: No eye pain, visual disturbances, or discharge  ENMT:  No difficulty hearing, tinnitus, vertigo; No sinus or throat pain  NECK: No pain or stiffness  BREASTS: No pain, masses, or nipple discharge  RESPIRATORY: No cough, wheezing, chills or hemoptysis; No shortness of breath  CARDIOVASCULAR: No chest pain, palpitations, dizziness, or leg swelling  GASTROINTESTINAL: No abdominal or epigastric pain. No nausea, vomiting, or hematemesis; No diarrhea or constipation. No melena or hematochezia.  GENITOURINARY: No dysuria, frequency, hematuria, or incontinence  NEUROLOGICAL: No headaches, memory loss, loss of strength, numbness, or tremors  SKIN: No itching, burning, rashes, or lesions   LYMPH NODES: No enlarged glands  ENDOCRINE: No heat or cold intolerance; No hair loss  MUSCULOSKELETAL: left knee joint pain  No muscle, back, or extremity pain, no calf tenderness  PSYCHIATRIC: No depression, anxiety, mood swings, or difficulty sleeping  HEME/LYMPH: No easy bruising, or bleeding gums  ALLERGY AND IMMUNOLOGIC: No hives or eczema      PHYSICAL EXAM:    GENERAL: NAD, well-groomed, well-developed, NAD  HEAD:  Atraumatic, Normocephalic  EYES: EOMI, PERRLA, conjunctiva and sclera clear  ENMT: No tonsillar erythema, exudates, or enlargement; Moist mucous membranes, Good dentition, No lesions  NECK: Supple, No JVD, Normal thyroid  NERVOUS SYSTEM:  Alert & Oriented X3, Good concentration; Motor Strength 5/5 B/L upper and lower extremities  CHEST/LUNG: Clear to percussion bilaterally; No rales, rhonchi, wheezing, or rubs  HEART: Regular rate and rhythm; No murmurs, rubs, or gallops  ABDOMEN: Soft, Nontender, Nondistended; Bowel sounds present  EXTREMITIES:  2+ Peripheral Pulses, No clubbing, cyanosis, or edema  Left knee bandaged.  LYMPH: No lymphadenopathy noted  SKIN: No rashes or lesions        LABS:                        11.5   8.5   )-----------( 232      ( 13 Jun 2018 07:59 )             34.3     06-13    134<L>  |  100  |  11  ----------------------------<  133<H>  4.0   |  26  |  0.98    Ca    9.7      13 Jun 2018 07:59            vanco through     RADIOLOGY & ADDITIONAL STUDIES:      CRITICAL CARE TIME SPENT:

## 2018-06-13 NOTE — DISCHARGE NOTE ADULT - PATIENT PORTAL LINK FT
You can access the [facility] Patient Portal, offered by [facility], by registering with the following website: [email or web address]

## 2018-06-13 NOTE — OCCUPATIONAL THERAPY INITIAL EVALUATION ADULT - ORIENTATION, REHAB EVAL
oriented to person, place, time and situation oriented to person, place, time and situation/Patient educated verbally regarding LE weight bearing status, d/c plan, DME needs & role of OT. Pt. provided with education folder including functional exercises, TKR education & caregiver guide pamphlet. Pt educated regarding fall prevention in hospital and recommendation to use call bell/ask for assistance with all ADL's/transfers etc.

## 2018-06-13 NOTE — CONSULT NOTE ADULT - PROBLEM SELECTOR RECOMMENDATION 5
- GI prophylaxis with protonix  - DVT prophylaxis with  bid  - Plan: home with home PT  - Advanced care planning discussed with patient/family. Advanced care planning forms reviewed/discussed/completed. 20 minutes spent.

## 2018-06-13 NOTE — OCCUPATIONAL THERAPY INITIAL EVALUATION ADULT - BED MOBILITY TRAINING, PT EVAL
Patient will perform bed mobility skills (supine to sit and sit to supine) with supervision in 1-3 sessions.

## 2018-06-13 NOTE — DISCHARGE NOTE ADULT - CARE PLAN
Principal Discharge DX:	Status post total left knee replacement  Goal:	return to activities of daily living  Assessment and plan of treatment:	Physical Therapy/Occupational Therapy for: ambulation, transfers, stairs, ADL's (activities of daily living), range of motion exercises, and isometrics  -Activity  • Weight Bearing as tolerated with rolling walker.  • Take short, frequent walks increasing the distance that you walk each day as tolerated.  • Change your position every hour to decrease pain and stiffness.  • Continue the exercises taught to you by your physical therapist.  • No driving until cleared by the doctor.  • No tub baths, hot tubs, or swimming pools until instructed by your doctor.  • Do not squat down on the floor.  • Do not kneel or twist your knee.  • Range of Motion Goals: Flexion= 120 degrees, Extension = 0 degrees  Keep incision clean and dry. May shower 5 days after surgery if no drainage from incision.  Suture removal 2 weeks after surgery at Surgeon's office.    Call Dr. Nelson' office [phone] to make an appointment to be seen within 7-10 days. [facility] - [address] [place] [address]  Assessment and plan of treatment:	- Call your doctor if you experience:  • An increase in pain not controlled by pain medication or change in activity or  position.  • Temperature greater than 101° F.  • Redness, increased swelling or foul smelling drainage from or around the  incision.  • Numbness, tingling or a change in color or temperature of the operative leg.  • Call your doctor immediately if you experience chest pain, shortness of breath or calf pain.

## 2018-06-13 NOTE — CONSULT NOTE ADULT - PROBLEM SELECTOR RECOMMENDATION 9
[facility] while [facility]  Advised repeat Polysomnography after d.c.
-S/P L TKR POD #1  - Continue post-op care  - PT  - Pain meds with celebrex ATC and oxycodone PRN  - DVT prophylaxis with  bid

## 2018-06-13 NOTE — OCCUPATIONAL THERAPY INITIAL EVALUATION ADULT - TRANSFER TRAINING, PT EVAL
Patient will transfer to toilet and stall shower with DME as needed with supervision in 1-3 sessions.

## 2018-06-13 NOTE — CONSULT NOTE ADULT - SUBJECTIVE AND OBJECTIVE BOX
PULMONARY/CRITICAL CARE        Patient is a 52y old  Male who presents with a chief complaint of left total knee replacement (11 Jun 2018 15:37)    BRIEF HOSPITAL COURSE: *** Stable postop    Events last 24 hours: ***    PAST MEDICAL & SURGICAL HISTORY:  Osteoarthritis: knees  HTN (hypertension)  Knee pain, right  Sleep apnea in adult: sleep study done 5 years ago.  DOES NOT USE CPAP.  Diabetes: rarely does acucheck  100-140  S/P total knee replacement: 2016, partial, right  S/P umbilical hernia repair, follow-up exam  S/P knee surgery: right knee arthroscopy    Allergies    No Known Allergies    Intolerances      FAMILY HISTORY and SOCIAL: No cigs, occasional etoh.  Family history of arrhythmia (Father)  Family history of hypertension (Father)  Family history of diabetes mellitus (Mother)      Review of Systems:  CONSTITUTIONAL: No fever, chills, or fatigue  EYES: No eye pain, visual disturbances, or discharge  ENMT:  No difficulty hearing, tinnitus, vertigo; No sinus or throat pain  NECK: No pain or stiffness  RESPIRATORY: No cough, wheezing, chills or hemoptysis; No shortness of breath  CARDIOVASCULAR: No chest pain, palpitations, dizziness, or leg swelling  GASTROINTESTINAL: No abdominal or epigastric pain. No nausea, vomiting, or hematemesis; No diarrhea or constipation. No melena or hematochezia.  GENITOURINARY: No dysuria, frequency, hematuria, or incontinence  NEUROLOGICAL: No headaches, memory loss, loss of strength, numbness, or tremors  SKIN: No itching, burning, rashes, or lesions   MUSCULOSKELETAL: left knee  joint pain  No muscle, back, or extremity pain  PSYCHIATRIC: No depression, anxiety, mood swings, or difficulty sleeping      Medications:        diphenhydrAMINE   Capsule 25 milliGRAM(s) Oral once PRN  fentaNYL    Injectable 25 MICROGram(s) IV Push every 5 minutes PRN  ondansetron Injectable 4 milliGRAM(s) IV Push once PRN              lactated ringers. 1000 milliLiter(s) IV Continuous <Continuous>                ICU Vital Signs Last 24 Hrs  T(C): 37.3 (12 Jun 2018 14:57), Max: 37.3 (12 Jun 2018 14:57)  T(F): 99.1 (12 Jun 2018 14:57), Max: 99.1 (12 Jun 2018 14:57)  HR: 78 (12 Jun 2018 17:47) (78 - 92)  BP: 139/86 (12 Jun 2018 17:15) (98/70 - 145/92)  BP(mean): --  ABP: --  ABP(mean): --  RR: 22 (12 Jun 2018 17:15) (17 - 24)  SpO2: 95% (12 Jun 2018 17:47) (94% - 99%)    Vital Signs Last 24 Hrs  T(C): 37.3 (12 Jun 2018 14:57), Max: 37.3 (12 Jun 2018 14:57)  T(F): 99.1 (12 Jun 2018 14:57), Max: 99.1 (12 Jun 2018 14:57)  HR: 78 (12 Jun 2018 17:47) (78 - 92)  BP: 139/86 (12 Jun 2018 17:15) (98/70 - 145/92)  BP(mean): --  RR: 22 (12 Jun 2018 17:15) (17 - 24)  SpO2: 95% (12 Jun 2018 17:47) (94% - 99%)        I&O's Detail    12 Jun 2018 07:01  -  12 Jun 2018 18:09  --------------------------------------------------------  IN:    lactated ringers.: 1950 mL  Total IN: 1950 mL    OUT:    Estimated Blood Loss: 150 mL  Total OUT: 150 mL    Total NET: 1800 mL            LABS:                CAPILLARY BLOOD GLUCOSE      POCT Blood Glucose.: 146 mg/dL (12 Jun 2018 15:06)        CULTURES:      Physical Examination:    General: No acute distress.  Obese male    HEENT: Pupils equal, reactive to light.  Symmetric.    PULM: Clear to auscultation bilaterally, no significant sputum production    CVS: Regular rate and rhythm, no murmurs, rubs, or gallops    ABD: Soft, nondistended, nontender, normoactive bowel sounds, no masses    EXT: No edema, nontender  Left knee bandaged.    SKIN: Warm and well perfused, no rashes noted.    NEURO: Alert, oriented, interactive, nonfocal    RADIOLOGY: ***    CRITICAL CARE TIME SPENT: ***
HPI: This is a 52 M with PMH of HTN DM who has had pain left knee for several years; tests showed arthritis. S/P L TKR. POD #1.        PAST MEDICAL & SURGICAL HISTORY:  Osteoarthritis: knees  HTN (hypertension)  Knee pain, right  Sleep apnea in adult: sleep study done 5 years ago.  DOES NOT USE CPAP.  Diabetes: rarely does acucheck  100-140  S/P total knee replacement: 2016, partial, right  S/P umbilical hernia repair, follow-up exam  S/P knee surgery: right knee arthroscopy      REVIEW OF SYSTEMS:    CONSTITUTIONAL: No fever, no weight loss, no fatigue  EYES: No eye pain, no visual disturbances  ENMT:  No difficulty hearing, no tinnitus, no vertigo; No sinus or throat pain  NECK: No pain or stiffness  RESPIRATORY: No cough, no wheezing, no chills, no hemoptysis; No shortness of breath  CARDIOVASCULAR: No chest pain, palpitations, dizziness, or leg swelling  GASTROINTESTINAL: No abdominal pain. No nausea, no vomiting, no hematemesis; No diarrhea, no constipation. No melena, no hematochezia.  GENITOURINARY: No dysuria, no frequency, no hematuria, no incontinence  NEUROLOGICAL: No headaches, no memory loss, no loss of strength, no numbness, no tremors  SKIN: No itching, no burning, no rashes   LYMPH NODES: No enlarged glands  ENDOCRINE: No heat, no cold intolerance; No hair loss  MUSCULOSKELETAL: No joint pain, no swelling; No muscle pain, no back pain, no extremity pain  PSYCHIATRIC: No depression, no anxiety, no mood swings, no difficulty sleeping  HEME/LYMPH: No easy bruising, no bleeding gums  ALLERGY AND IMMUNOLOGIC: No hives, no eczema      MEDICATIONS  (STANDING):  acetaminophen   Tablet. 1000 milliGRAM(s) Oral every 8 hours  amLODIPine   Tablet 10 milliGRAM(s) Oral daily  aspirin enteric coated 162 milliGRAM(s) Oral every 12 hours  celecoxib 200 milliGRAM(s) Oral two times a day  dextrose 5%. 1000 milliLiter(s) (50 mL/Hr) IV Continuous <Continuous>  dextrose 50% Injectable 12.5 Gram(s) IV Push once  dextrose 50% Injectable 25 Gram(s) IV Push once  dextrose 50% Injectable 25 Gram(s) IV Push once  docusate sodium 100 milliGRAM(s) Oral three times a day  insulin lispro (HumaLOG) corrective regimen sliding scale   SubCutaneous three times a day before meals  lactated ringers. 1000 milliLiter(s) (100 mL/Hr) IV Continuous <Continuous>  metFORMIN 1000 milliGRAM(s) Oral two times a day  pantoprazole    Tablet 40 milliGRAM(s) Oral daily  senna 2 Tablet(s) Oral at bedtime    MEDICATIONS  (PRN):  aluminum hydroxide/magnesium hydroxide/simethicone Suspension 30 milliLiter(s) Oral four times a day PRN Indigestion  dextrose 40% Gel 15 Gram(s) Oral once PRN Blood Glucose LESS THAN 70 milliGRAM(s)/deciliter  diphenhydrAMINE   Capsule 25 milliGRAM(s) Oral once PRN Rash and/or Itching  glucagon  Injectable 1 milliGRAM(s) IntraMuscular once PRN Glucose LESS THAN 70 milligrams/deciliter  HYDROmorphone  Injectable 0.5 milliGRAM(s) IV Push every 3 hours PRN Severe Pain (7 - 10)  magnesium hydroxide Suspension 30 milliLiter(s) Oral daily PRN Constipation  ondansetron Injectable 4 milliGRAM(s) IV Push every 6 hours PRN Nausea and/or Vomiting  oxyCODONE    IR 5 milliGRAM(s) Oral every 3 hours PRN Mild Pain (1 - 3)  oxyCODONE    IR 10 milliGRAM(s) Oral every 3 hours PRN Moderate Pain (4 - 6)  polyethylene glycol 3350 17 Gram(s) Oral daily PRN Constipation      Allergies    No Known Allergies    Intolerances        SOCIAL HISTORY: no tobacco, no etoh    FAMILY HISTORY:  Family history of arrhythmia (Father)  Family history of hypertension (Father)  Family history of diabetes mellitus (Mother)      Vital Signs Last 24 Hrs  T(C): 36.8 (13 Jun 2018 07:25), Max: 37.3 (12 Jun 2018 14:57)  T(F): 98.3 (13 Jun 2018 07:25), Max: 99.1 (12 Jun 2018 14:57)  HR: 97 (13 Jun 2018 07:25) (74 - 97)  BP: 133/80 (13 Jun 2018 07:25) (98/70 - 154/55)  BP(mean): --  RR: 18 (13 Jun 2018 07:25) (12 - 24)  SpO2: 100% (13 Jun 2018 07:25) (94% - 100%)    PHYSICAL EXAM:    GENERAL: NAD  HEAD:  Atraumatic, Normocephalic  EYES: EOMI, PERRLA, conjunctiva and sclera clear  ENMT: No tonsillar erythema, exudates, or enlargement; Moist mucous membranes  NECK: Supple, No JVD, Normal thyroid  NERVOUS SYSTEM:  Alert & Oriented X3, Motor Strength 5/5 B/L upper and lower extremities;  CHEST/LUNG: Clear to percussion bilaterally; No rales, rhonchi, wheezing, or rubs  HEART: Regular rate and rhythm; No murmurs, rubs, or gallops  ABDOMEN: Soft, Nontender, Nondistended; Bowel sounds present  EXTREMITIES:  2+ Peripheral Pulses, No clubbing, cyanosis, or edema; left knee ace wrapped  LYMPH: No lymphadenopathy   SKIN: No rashes or lesions      LABS:                        11.5   8.5   )-----------( 232      ( 13 Jun 2018 07:59 )             34.3     06-13    134<L>  |  100  |  11  ----------------------------<  133<H>  4.0   |  26  |  0.98    Ca    9.7      13 Jun 2018 07:59            RADIOLOGY & ADDITIONAL STUDIES:    < from: Xray Knee 3 Views, Left (06.12.18 @ 14:43) >    EXAM:  KNEE AP LAT & OBL LEFT                                  PROCEDURE DATE:  06/12/2018          INTERPRETATION:  Clinical information: Left knee osteoarthritis    Portable postop study obtained in the operating room, 2:25 PM    2 views, frontaland lateral.    Total left knee replacement is identified, in place. Drain visible   adjacent to the femoral component. Suggest follow-up left knee   radiographs when clinically warranted.    IMPRESSION:    See above report                  ELEUTERIO TITUS M.D.,ATTENDING RADIOLOGIST  This document has been electronically signed. Jun 12 2018  2:56PM                < end of copied text >

## 2021-12-31 NOTE — PHYSICAL THERAPY INITIAL EVALUATION ADULT - PHYSICAL ASSIST/NONPHYSICAL ASSIST: STAND/SIT, REHAB EVAL
No respiratory distress. No stridor, Lungs sounds clear with good aeration bilaterally. 1 person + 1 person to manage equipment

## 2022-10-28 NOTE — PROGRESS NOTE ADULT - PROBLEM SELECTOR PLAN 2
· History of hyperlipidemia  · Last total cholesterol 180, triglycerides 56, HDL 33,   · Continue Zetia 10 mg daily Ambulate  Incentive spirometry  dvt prophylaxis

## 2022-11-01 ENCOUNTER — OFFICE (OUTPATIENT)
Dept: URBAN - METROPOLITAN AREA CLINIC 104 | Facility: CLINIC | Age: 57
Setting detail: OPHTHALMOLOGY
End: 2022-11-01
Payer: COMMERCIAL

## 2022-11-01 DIAGNOSIS — H25.13: ICD-10-CM

## 2022-11-01 DIAGNOSIS — E11.9: ICD-10-CM

## 2022-11-01 PROCEDURE — 92134 CPTRZ OPH DX IMG PST SGM RTA: CPT | Performed by: SPECIALIST

## 2022-11-01 PROCEDURE — 92004 COMPRE OPH EXAM NEW PT 1/>: CPT | Performed by: SPECIALIST

## 2022-11-01 ASSESSMENT — CONFRONTATIONAL VISUAL FIELD TEST (CVF)
OD_FINDINGS: FULL
OS_FINDINGS: FULL

## 2022-11-01 ASSESSMENT — TONOMETRY
OS_IOP_MMHG: 16
OD_IOP_MMHG: 16

## 2022-11-01 ASSESSMENT — REFRACTION_AUTOREFRACTION
OD_SPHERE: 0.00
OD_AXIS: 40
OD_CYLINDER: -0.75
OS_CYLINDER: -0.50
OS_AXIS: 100
OS_SPHERE: 0.00

## 2022-11-01 ASSESSMENT — VISUAL ACUITY
OS_BCVA: 20/20
OD_BCVA: 20/25

## 2022-11-01 ASSESSMENT — SPHEQUIV_DERIVED
OS_SPHEQUIV: -0.25
OD_SPHEQUIV: -0.375

## 2022-11-25 ENCOUNTER — OFFICE (OUTPATIENT)
Dept: URBAN - METROPOLITAN AREA CLINIC 104 | Facility: CLINIC | Age: 57
Setting detail: OPHTHALMOLOGY
End: 2022-11-25
Payer: COMMERCIAL

## 2022-11-25 DIAGNOSIS — E11.3311: ICD-10-CM

## 2022-11-25 DIAGNOSIS — E11.3313: ICD-10-CM

## 2022-11-25 DIAGNOSIS — H25.13: ICD-10-CM

## 2022-11-25 PROCEDURE — 92235 FLUORESCEIN ANGRPH MLTIFRAME: CPT | Performed by: SPECIALIST

## 2022-11-25 PROCEDURE — 92134 CPTRZ OPH DX IMG PST SGM RTA: CPT | Performed by: SPECIALIST

## 2022-11-25 PROCEDURE — 67210 TREATMENT OF RETINAL LESION: CPT | Performed by: SPECIALIST

## 2022-11-25 PROCEDURE — 92014 COMPRE OPH EXAM EST PT 1/>: CPT | Performed by: SPECIALIST

## 2022-11-25 ASSESSMENT — CONFRONTATIONAL VISUAL FIELD TEST (CVF)
OD_FINDINGS: FULL
OS_FINDINGS: FULL

## 2022-11-25 ASSESSMENT — VISUAL ACUITY
OD_BCVA: 20/25+2
OS_BCVA: 20/20-1

## 2022-12-09 ENCOUNTER — OFFICE (OUTPATIENT)
Dept: URBAN - METROPOLITAN AREA CLINIC 104 | Facility: CLINIC | Age: 57
Setting detail: OPHTHALMOLOGY
End: 2022-12-09
Payer: COMMERCIAL

## 2022-12-09 DIAGNOSIS — E11.3312: ICD-10-CM

## 2022-12-09 PROBLEM — E11.3311 DM TYPE 2; RIGHT MOD WITH ME, LEFT MOD WITH ME: Status: ACTIVE | Noted: 2022-11-25

## 2022-12-09 PROBLEM — E11.3313 DM TYPE 2; RIGHT MOD WITH ME, LEFT MOD WITH ME: Status: ACTIVE | Noted: 2022-11-25

## 2022-12-09 PROBLEM — H25.13 CATARACT SENILE NUCLEAR SCLEROSIS; BOTH EYES ;
W/CORTICAL: Status: ACTIVE | Noted: 2022-11-01

## 2022-12-09 PROCEDURE — 67210 TREATMENT OF RETINAL LESION: CPT | Performed by: SPECIALIST

## 2022-12-09 ASSESSMENT — VISUAL ACUITY
OS_BCVA: 20/20
OD_BCVA: 20/25

## 2023-02-24 ENCOUNTER — OFFICE (OUTPATIENT)
Dept: URBAN - METROPOLITAN AREA CLINIC 104 | Facility: CLINIC | Age: 58
Setting detail: OPHTHALMOLOGY
End: 2023-02-24
Payer: COMMERCIAL

## 2023-02-24 DIAGNOSIS — E11.3313: ICD-10-CM

## 2023-02-24 DIAGNOSIS — E11.3311: ICD-10-CM

## 2023-02-24 PROCEDURE — 92134 CPTRZ OPH DX IMG PST SGM RTA: CPT | Performed by: SPECIALIST

## 2023-02-24 PROCEDURE — 67210 TREATMENT OF RETINAL LESION: CPT | Performed by: SPECIALIST

## 2023-02-24 PROCEDURE — 92235 FLUORESCEIN ANGRPH MLTIFRAME: CPT | Performed by: SPECIALIST

## 2023-02-24 PROCEDURE — 92014 COMPRE OPH EXAM EST PT 1/>: CPT | Performed by: SPECIALIST

## 2023-02-24 ASSESSMENT — VISUAL ACUITY
OD_BCVA: 20/30
OS_BCVA: 20/20

## 2023-03-10 ENCOUNTER — OFFICE (OUTPATIENT)
Dept: URBAN - METROPOLITAN AREA CLINIC 104 | Facility: CLINIC | Age: 58
Setting detail: OPHTHALMOLOGY
End: 2023-03-10
Payer: COMMERCIAL

## 2023-03-10 DIAGNOSIS — E11.3312: ICD-10-CM

## 2023-03-10 PROCEDURE — 67210 TREATMENT OF RETINAL LESION: CPT | Performed by: SPECIALIST

## 2023-03-10 ASSESSMENT — VISUAL ACUITY
OS_BCVA: 20/25
OD_BCVA: 20/30+

## 2023-10-20 ENCOUNTER — OFFICE (OUTPATIENT)
Dept: URBAN - METROPOLITAN AREA CLINIC 63 | Facility: CLINIC | Age: 58
Setting detail: OPHTHALMOLOGY
End: 2023-10-20
Payer: COMMERCIAL

## 2023-10-20 DIAGNOSIS — H25.13: ICD-10-CM

## 2023-10-20 DIAGNOSIS — E11.3312: ICD-10-CM

## 2023-10-20 DIAGNOSIS — E11.3313: ICD-10-CM

## 2023-10-20 PROCEDURE — 67210 TREATMENT OF RETINAL LESION: CPT | Mod: LT | Performed by: SPECIALIST

## 2023-10-20 PROCEDURE — 92014 COMPRE OPH EXAM EST PT 1/>: CPT | Performed by: SPECIALIST

## 2023-10-20 PROCEDURE — 92134 CPTRZ OPH DX IMG PST SGM RTA: CPT | Performed by: SPECIALIST

## 2023-10-20 PROCEDURE — 92235 FLUORESCEIN ANGRPH MLTIFRAME: CPT | Performed by: SPECIALIST

## 2023-10-20 ASSESSMENT — TONOMETRY
OS_IOP_MMHG: 19
OD_IOP_MMHG: 18

## 2023-10-20 ASSESSMENT — VISUAL ACUITY
OD_BCVA: 20/25-1
OS_BCVA: 20/20

## 2023-11-07 ENCOUNTER — OFFICE (OUTPATIENT)
Dept: URBAN - METROPOLITAN AREA CLINIC 104 | Facility: CLINIC | Age: 58
Setting detail: OPHTHALMOLOGY
End: 2023-11-07
Payer: COMMERCIAL

## 2023-11-07 DIAGNOSIS — E11.3311: ICD-10-CM

## 2023-11-07 DIAGNOSIS — E11.3312: ICD-10-CM

## 2023-11-07 DIAGNOSIS — H25.12: ICD-10-CM

## 2023-11-07 DIAGNOSIS — Z79.4: ICD-10-CM

## 2023-11-07 PROCEDURE — 92014 COMPRE OPH EXAM EST PT 1/>: CPT | Mod: 24 | Performed by: SPECIALIST

## 2023-11-07 ASSESSMENT — REFRACTION_AUTOREFRACTION
OS_CYLINDER: -0.50
OS_SPHERE: PLANO
OS_AXIS: 92
OD_SPHERE: -0.25

## 2023-11-07 ASSESSMENT — CONFRONTATIONAL VISUAL FIELD TEST (CVF)
OD_FINDINGS: FULL
OS_FINDINGS: FULL

## 2023-11-07 ASSESSMENT — REFRACTION_CURRENTRX
OD_OVR_VA: 20/
OS_OVR_VA: 20/

## 2023-12-22 ENCOUNTER — OFFICE (OUTPATIENT)
Dept: URBAN - METROPOLITAN AREA CLINIC 104 | Facility: CLINIC | Age: 58
Setting detail: OPHTHALMOLOGY
End: 2023-12-22
Payer: COMMERCIAL

## 2023-12-22 DIAGNOSIS — E11.3311: ICD-10-CM

## 2023-12-22 PROCEDURE — 67210 TREATMENT OF RETINAL LESION: CPT | Mod: 79,RT | Performed by: SPECIALIST

## 2023-12-22 ASSESSMENT — CONFRONTATIONAL VISUAL FIELD TEST (CVF)
OD_FINDINGS: FULL
OS_FINDINGS: FULL

## 2024-02-21 ENCOUNTER — OFFICE (OUTPATIENT)
Dept: URBAN - METROPOLITAN AREA CLINIC 63 | Facility: CLINIC | Age: 59
Setting detail: OPHTHALMOLOGY
End: 2024-02-21
Payer: COMMERCIAL

## 2024-02-21 DIAGNOSIS — E11.3312: ICD-10-CM

## 2024-02-21 DIAGNOSIS — E11.3311: ICD-10-CM

## 2024-02-21 PROCEDURE — 92134 CPTRZ OPH DX IMG PST SGM RTA: CPT | Performed by: SPECIALIST

## 2024-02-21 PROCEDURE — 92235 FLUORESCEIN ANGRPH MLTIFRAME: CPT | Performed by: SPECIALIST

## 2024-02-21 PROCEDURE — 67210 TREATMENT OF RETINAL LESION: CPT | Mod: 79,LT | Performed by: SPECIALIST

## 2024-02-21 ASSESSMENT — CONFRONTATIONAL VISUAL FIELD TEST (CVF)
OD_FINDINGS: FULL
OS_FINDINGS: FULL

## 2024-02-27 NOTE — H&P PST ADULT - NSALCOHOLFREQ_GEN_A_CORE_SD
OFFICE VISIT    Patient: Brittney Zee   : 1945 MRN: [id number]    SUBJECTIVE:  Chief Complaint   Patient presents with    Office Visit     6 month follow up with fasting labs prior.       UTI     Burning with urination, urinary frequency. Symptoms started 2 weeks ago.      A 78 year old female presents for a follow-up of multiple medical conditions.     HISTORY OF PRESENT ILLNESS:    Historian: Self.    Dysuria: She is experiencing burning with urination, and urinary frequency since 2 weeks ago. Her last dysuria was 40 years ago. Denies flank pain and abdominal pain. She is drinking Cranberry juice for her dysuria. Her urine is dark yellow due to B complex tablets. Has no issues with Hydrochlorothiazide with urinary frequency.    Essential hypertension, benign: Is on Atenolol (TENORMIN) 50 mg, Hydrochlorothiazide (HYDRODIURIL) 12.5 mg, Lisinopril (ZESTRIL) 20 mg. Denies swelling in legs.    Hyperlipidemia LDL goal <130: Is on Simvastatin (ZOCOR) 10 mg. Last cholesterol levels were at 199 mg/dL, Triglycerides 206 mg/dL, HDL 52 mg/dL,  mg/dL. She is on fish oil supplements, DHA-EPA-Flaxseed Oil-Vitamin E.    Prediabetes: Last HbA1c was increased from 6.2% to 6.3% and blood glucose is at 126 mg/dL. Denies increased thirst and hunger, but has decreased hunger. She does take carbs.     Gastroesophageal reflux disease without esophagitis: Is on Famotidine (PEPCID) 20 mg. Has no issues with acid reflux now. Denies trouble swallowing.    Screening for deficiency anemia: Due.     Additional comments      Screening labs: Fasting labs done prior to appointment were reviewed and discussed with the patient. Electrolytes, liver enzymes, renal function and blood proteins were normal. Calcium was well supplemented and admits taking supplements and food rich in calcium.    Has issues with coughing with sinus infection sometimes.      PAST MEDICAL HISTORY:  Past Medical History:   Diagnosis Date    Allergy     Blood  transfusion without reported diagnosis     Disorder of bone and cartilage, unspecified 04/26/2013    GERD (gastroesophageal reflux disease)     HTN (hypertension)     early 50's    Hyperlipidemia     Multinodular goiter     Osteopenia     Perennial allergic rhinitis      MEDICATIONS:  Current Outpatient Medications   Medication Sig Dispense Refill    phenazopyridine (PYRIDIUM) 100 MG tablet Take 1 tablet by mouth 3 times daily as needed (Urinary pain or frequency). 12 tablet 0    metFORMIN (GLUCOPHAGE-XR) 500 MG 24 hr tablet Take 1 tablet by mouth daily (with lunch). 90 tablet 3    nitrofurantoin, macrocrystal-monohydrate, (MACROBID) 100 MG capsule Take 1 capsule by mouth in the morning and 1 capsule in the evening. Do all this for 5 days. 10 capsule 0    simvastatin (ZOCOR) 10 MG tablet Take 1 tablet by mouth every evening. 90 tablet 3    atenolol (TENORMIN) 50 MG tablet Take 1 tablet by mouth daily. 90 tablet 3    hydroCHLOROthiazide (HYDRODIURIL) 12.5 MG tablet TAKE 1 TABLET BY MOUTH DAILY FOR HIGH BLOOD PRESSURE 90 tablet 1    lisinopril (ZESTRIL) 20 MG tablet Take 1 tablet by mouth daily. 90 tablet 3    famotidine (PEPCID) 20 MG tablet Take 20 mg by mouth daily as needed.      clobetasol (Temovate) 0.05 % cream Apply 1 application. topically 2 times daily. (Patient not taking: Reported on 2/26/2024) 30 g 0    cetirizine (ZyrTEC) 10 MG chewable tablet Chew 10 mg by mouth daily.      cholecalciferol (VITAMIN D3) 25 mcg (1,000 units) tablet Take 2,000 Units by mouth 2 times daily as needed.      magnesium 250 MG tablet Take 250 mg by mouth.      Multiple Vitamins-Minerals (ZINC PO) Take 50 mg by mouth.      Ascorbic Acid (VITAMIN C) 500 MG tablet Take 500 mg by mouth daily.      Omega-3 Fatty Acids (FISH OIL) 600 MG Cap Take 4 tablets by mouth.      Probiotic Product (PROBIOTIC DAILY PO) Take  by mouth.      DHA-EPA-Flaxseed Oil-Vitamin E (THERA TEARS NUTRITION PO) Take  by mouth.      Daily Multiple Vitamins TABS  Take 1 tablet by mouth daily.      B Complex Vitamins (VITAMIN B COMPLEX PO) Take 3 capsules by mouth nightly.       No current facility-administered medications for this visit.     ALLERGIES:  Allergies as of 02/26/2024 - Reviewed 02/26/2024   Allergen Reaction Noted    Sulfa antibiotics RASH and Other (See Comments)      FAMILY HISTORY:  Family History   Problem Relation Age of Onset    Heart disease Mother         Myocardial infarction, bypass surgery in 2 different occasions    Stroke Mother     Heart Mother         heart disease    Hypertension Mother     High cholesterol Mother     Myocardial Infarction Mother     Cancer Father         Cancer of the larynx, prostate cancer    Osteoarthritis Father     Alcohol Abuse Father     Alcohol Abuse Sister     Alcohol Abuse Sister     Hypertension Sister     Cancer, Skin Sister     Hypertension Sister     Hyperlipidemia Sister     Stroke Sister     Alcohol Abuse Brother     Cancer Brother         bladder cancer    Alcohol Abuse Brother     Patient is unaware of any medical problems Daughter     Patient is unaware of any medical problems Son      SOCIAL HISTORY:  Social History     Tobacco Use    Smoking status: Never    Smokeless tobacco: Never   Substance Use Topics    Alcohol use: Not Currently     Alcohol/week: 1.0 standard drink of alcohol     Types: 1 Standard drinks or equivalent per week     Comment: 1 drink every Sunday - bloody Natalia    Drug use: Never     Past Surgical HISTORY  Past Surgical History:   Procedure Laterality Date    Appendectomy      Colon surgery  10/06/2009    Laparoscopic partial ileocolectomy. Right lower quadrant phlegmon 2nd to perforated appendicitis.    Colonoscopy  12/10/2009    repeat colon in 10 yrs.    Colonoscopy  08/27/2004    Colonoscopy  01/22/2020    Fracture surgery      right femoral fracture with ORIF, from falling    Inguinal hernia repair Left     age 31    Pap smear  04/13/2012    Tubal ligation         REVIEW OF  SYSTEMS:    Constitutional: Denies fevers, chills, weakness, fatigue, loss of appetite, abnormal weight gain or abnormal weight loss.  Eyes: Denies blindness, blurred vision, double vision, pain, itching or burning.  HENT: Denies facial pain, ear pain, hearing loss, tinnitus, nasal congestion, rhinorrhea, epistaxis, sinus pain, mouth lesions or sore throat.  Respiratory: Denies shortness of breath, cough, sputum production, hemoptysis or wheezing.  Cardiovascular: Denies chest pains, palpitations, tachycardia, edema, cyanosis or vertigo.  No near-syncope or syncope. No exertional chest pains or shortness of breath. No orthopnea or paroxysmal nocturnal dyspnea. No leg swelling.  Gastrointestinal: Denies abdominal pain, heartburn, nausea, vomiting, diarrhea, constipation or blood in stool.  No hematemesis, hematochezia or rectal bleeding.  Musculoskeletal: Denies back pain, joint pain, joint swelling or tenderness, muscle pains or spasms.  Denies neck pain, stiffness or swelling.  Neurologic: Denies numbness, tingling, other sensory changes, sudden weakness in arms or legs.  Denies confusion, headache, dizziness, memory loss or tremors.   Genitourinary: Denies nocturia, urgency, incontinence, hematuria.  No problems with impotence or libido.   No discharge. +ve urinary frequency,  dysuria.  Hematologic/Lymphatic: Denies easy bruising or bleeding, swollen lymph glands.  Endocrine: Denies heat or cold intolerance, polydipsia or polyuria.  Deny changes in hair or skin texture.  +ve decreased hunger.   Integument: Denies new rashes or lesions, pruritus or dryness of skin.  Psychiatric: Denies anxiety, depression, hallucinations, irritability or sleeping problems.  Allergic/Immunologic: Denies recurrent infections, hypersensitivity.  All other Review of Systems negative.      OBJECTIVE:  Visit Vitals  BP (!) 148/90 (BP Location: RUE - Right upper extremity, Patient Position: Sitting, Cuff Size: Regular)   Pulse 72   Ht 5'  2.5\"   Wt 65.1 kg (143 lb 9.6 oz)   BMI 25.85 kg/m²       PHYSICAL EXAM:    Constitutional: Alert, in no acute distress and current vital signs reviewed.   Head and Face: Atraumatic, no deformities, normocephalic, normal facies.   Eyes: No discharge, normal conjunctiva, no eyelid swelling, no ptosis and the sclerae were normal. Pupils equal, round and reactive to light and accommodation, conjugate gaze and extraocular movements were intact.   ENT: Normal appearing outer ear, normal appearing nose. Examination of the tympanic membrane showed normal landmarks, normal appearing external canal. Nasal mucosa is moist and pink, no nasal discharge. Normal lips. Oral mucosa pink and moist, no oral lesions.   Neck: Normal appearing neck, supple neck and no mass was seen. Thyroid not enlarged and no thyroid nodules. No lymphadenopathy.   Chest: Normal chest appearance.   Pulmonary: No respiratory distress, normal respiratory rate and effort and no accessory muscle use. Breath sounds clear to auscultation bilaterally.   Cardiovascular: Normal rate, no murmurs were heard, regular rhythm, normal S1 and normal S2. Edema was not present in the lower extremities.   Abdomen: Soft, nontender, nondistended, normal bowel sounds and no abdominal mass. No hepatomegaly and no splenomegaly. No umbilical hernia was discovered.  No abdominal pain to palpation. No acute changes.   Musculoskeletal: Normal gait. No musculoskeletal erythema was seen, no joint swelling seen and no joint tenderness was elicited. No scoliosis. Normal range of motion. There was no joint instability noted. Muscle strength and tone were normal. No costovertebral tenderness bilaterally, no pelvic pain.   Neurologic: Cranial nerves grossly intact. Normal DTRs. No sensory deficits noted. No coordination deficits.  Psychiatric: Oriented to person, oriented to place and oriented to time. Alert and awake, interactive and mood/affect were appropriate. Judgment not impaired.  Normal attention span. Short term memory intact.   Skin, Hair, Nails: Normal skin color and pigmentation and no rash. No foot ulcers and no skin ulcers were seen. Normal skin turgor. No clubbing or cyanosis of the fingernails.      DIAGNOSTIC STUDIES:  LAB RESULTS:  Office Visit on 02/26/2024   Component Date Value Ref Range Status    COLOR, URINALYSIS 02/26/2024 Straw   Final    APPEARANCE, URINALYSIS 02/26/2024 Cloudy   Final    GLUCOSE, URINALYSIS 02/26/2024 Negative  Negative mg/dL Final    BILIRUBIN, URINALYSIS 02/26/2024 Negative  Negative Final    KETONES, URINALYSIS 02/26/2024 Negative  Negative mg/dL Final    SPECIFIC GRAVITY, URINALYSIS 02/26/2024 1.006  1.005 - 1.030 Final    OCCULT BLOOD, URINALYSIS 02/26/2024 Small (A)  Negative Final    PH, URINALYSIS 02/26/2024 6.5  5.0 - 7.0 Final    PROTEIN, URINALYSIS 02/26/2024 Negative  Negative mg/dL Final    UROBILINOGEN, URINALYSIS 02/26/2024 0.2  0.2, 1.0 mg/dL Final    NITRITE, URINALYSIS 02/26/2024 Negative  Negative Final    LEUKOCYTE ESTERASE, URINALYSIS 02/26/2024 Large (A)  Negative Final    SQUAMOUS EPITHELIAL, URINALYSIS 02/26/2024 None Seen  None Seen, 1 to 5 /hpf Final    ERYTHROCYTES, URINALYSIS 02/26/2024 11 to 25 (A)  None Seen, 1 to 2 /hpf Final    LEUKOCYTES, URINALYSIS 02/26/2024 >100 (A)  None Seen, 1 to 5 /hpf Final    BACTERIA, URINALYSIS 02/26/2024 Few (A)  None Seen /hpf Final    HYALINE CASTS, URINALYSIS 02/26/2024 None Seen  None Seen, 1 to 5 /lpf Final   Lab Services on 02/19/2024   Component Date Value Ref Range Status    Fasting Status 02/19/2024 14  0 - 999 Hours Final    Sodium 02/19/2024 144  135 - 145 mmol/L Final    Potassium 02/19/2024 4.1  3.4 - 5.1 mmol/L Final    Chloride 02/19/2024 104  97 - 110 mmol/L Final    Carbon Dioxide 02/19/2024 29  21 - 32 mmol/L Final    Anion Gap 02/19/2024 15  7 - 19 mmol/L Final    Glucose 02/19/2024 126 (H)  70 - 99 mg/dL Final    BUN 02/19/2024 15  6 - 20 mg/dL Final    Creatinine 02/19/2024  0.64  0.51 - 0.95 mg/dL Final    Glomerular Filtration Rate 02/19/2024 >90  >=60 Final    BUN/Cr 02/19/2024 23  7 - 25 Final    Calcium 02/19/2024 9.9  8.4 - 10.2 mg/dL Final    Hemoglobin A1C 02/19/2024 6.3 (H)  4.5 - 5.6 % Final    Cholesterol 02/19/2024 199  <=199 mg/dL Final    Triglycerides 02/19/2024 206 (H)  <=149 mg/dL Final    HDL 02/19/2024 52  >=50 mg/dL Final    LDL 02/19/2024 106  <=129 mg/dL Final    Non-HDL Cholesterol 02/19/2024 147  mg/dL Final    Cholesterol/ HDL Ratio 02/19/2024 3.8  <=4.4 Final       ASSESSMENT AND PLAN:  This 78 year old female presents with :  1. Dysuria    2. Essential hypertension, benign    3. Hyperlipidemia LDL goal <130    4. Prediabetes    5. Gastroesophageal reflux disease without esophagitis    6. Screening for deficiency anemia        Orders Placed This Encounter    CBC with Automated Differential    Comprehensive Metabolic Panel    Glycohemoglobin    Lipid Panel With Reflex    Urinalysis & Reflex Microscopy With Culture If Indicated    Urine, Bacterial Culture    phenazopyridine (PYRIDIUM) 100 MG tablet    metFORMIN (GLUCOPHAGE-XR) 500 MG 24 hr tablet       PLAN:    Dysuria  Ordered Urinalysis & Reflex Microscopy With Culture If Indicated  Ordered Urine, Bacterial Culture  Prescribed Phenazopyridine (PYRIDIUM) 100 MG tablet; Take 1 tablet by mouth 3 times daily as needed (Urinary pain or frequency).  Dispense: 12 tablet; Refill: 0  Advised taking plenty of fluids.  Informed will send prescription for antibiotics if noticed any infection in urine.     Essential hypertension, benign  Not controlled.   Continue Atenolol (TENORMIN) 50 mg, Hydrochlorothiazide (HYDRODIURIL) 12.5 mg, Lisinopril (ZESTRIL) 20 mg.    Hyperlipidemia LDL goal <130  Reviewed and discussed prior reports.  Ordered Lipid Panel With Reflex; Future  Continue Simvastatin (ZOCOR) 10 mg.    Prediabetes  Not controlled with diet.  Reviewed and discussed prior reports.  Ordered Comprehensive Metabolic  Panel; Future  Ordered Glycohemoglobin; Future  Prescribed Metformin (GLUCOPHAGE-XR) 500 MG 24 hr tablet; Take 1 tablet by mouth daily (with lunch).  Dispense: 90 tablet; Refill: 3  Discussed benefits and side effects of Metformin.  Let us know about side effects with Metformin if noticed any.  Advised to stay active, and doing exercises regularly.     Gastroesophageal reflux disease without esophagitis  Stable.  Continue Famotidine (PEPCID) 20 mg.     Screening for deficiency anemia  Ordered CBC with Automated Differential; Future    Follow-up in 6 months for a medicare wellness exam, with fasting labs prior or sooner as needed.     Refer to orders.  Medical compliance with plan discussed and risks of non-compliance reviewed.  Patient education completed on disease process, etiology & prognosis.  Proper usage and side effects of medications reviewed & discussed.  Patient understands and agrees with the plan.  Return to clinic as clinically indicated as discussed with the patient who verbalized understanding of the plan and is in agreement with the plan.    Return in about 6 months (around 8/26/2024) for Medicare Wellness Exam, with fasting labs prior.    IKayy, have created a visit summary document based on the audio recording between Ms. Kelly M Romberg, APNP and this patient for the physician to review, edit as needed, and authenticate.    Creation Date: 2/26/2024     2-4 times/mo

## 2024-04-19 ENCOUNTER — OFFICE (OUTPATIENT)
Dept: URBAN - METROPOLITAN AREA CLINIC 63 | Facility: CLINIC | Age: 59
Setting detail: OPHTHALMOLOGY
End: 2024-04-19
Payer: COMMERCIAL

## 2024-04-19 DIAGNOSIS — E11.3311: ICD-10-CM

## 2024-04-19 PROCEDURE — 67210 TREATMENT OF RETINAL LESION: CPT | Mod: 79,RT | Performed by: SPECIALIST

## 2024-06-20 NOTE — PRE-OP CHECKLIST - DNR CLARIFICATION FORM COMPLETED
Care Transitions Program Weekly Follow-up Call    Current Issues/Problems reviewed on call: Spoke with [place]; Mariam is talking stronger today. No cough. Taking some very small amounts of food for pleasure, but has ST today through [facility] and will see what their recommendations are.  Ordoñez catheter is out, and voiding.   No questions or concerns for writer.  No concerning symptoms.   Spoke with Olive; reports doing well. Catheter is out. No med questions.    Review of Systems:   Care Transition System Evaluation:    Fever: is not present   Pain:   0  General Symptoms: WDL (absence of symptoms)  Respiratory Symptoms: WDL (absence of symptoms)   Symptoms: WDL (absence of symptoms)  Sleeping Behaviors:   Current Lines/Drains:No    The caregiver did not have questions or concerns regarding the medications prescribed; bubble packed from [email or web address].     Transitional Care Management (TCM) appointment was completed.  TCM appointment plan of care and upcoming appointments were reviewed with patient.  Transportation to upcoming appointments to be provided by group [place].     Next Care Transitions follow-up call will be within 1 week.     n/a

## 2024-07-19 ENCOUNTER — OFFICE (OUTPATIENT)
Dept: URBAN - METROPOLITAN AREA CLINIC 63 | Facility: CLINIC | Age: 59
Setting detail: OPHTHALMOLOGY
End: 2024-07-19
Payer: COMMERCIAL

## 2024-07-19 DIAGNOSIS — E11.3312: ICD-10-CM

## 2024-07-19 DIAGNOSIS — E11.3313: ICD-10-CM

## 2024-07-19 DIAGNOSIS — H25.12: ICD-10-CM

## 2024-07-19 PROCEDURE — 92014 COMPRE OPH EXAM EST PT 1/>: CPT | Mod: 57 | Performed by: SPECIALIST

## 2024-07-19 PROCEDURE — 67210 TREATMENT OF RETINAL LESION: CPT | Mod: LT | Performed by: SPECIALIST

## 2024-07-19 PROCEDURE — 92134 CPTRZ OPH DX IMG PST SGM RTA: CPT | Performed by: SPECIALIST

## 2024-07-19 PROCEDURE — 92235 FLUORESCEIN ANGRPH MLTIFRAME: CPT | Performed by: SPECIALIST

## 2024-07-19 ASSESSMENT — CONFRONTATIONAL VISUAL FIELD TEST (CVF)
OS_FINDINGS: FULL
OD_FINDINGS: FULL

## 2024-08-02 ENCOUNTER — OFFICE (OUTPATIENT)
Dept: URBAN - METROPOLITAN AREA CLINIC 63 | Facility: CLINIC | Age: 59
Setting detail: OPHTHALMOLOGY
End: 2024-08-02
Payer: COMMERCIAL

## 2024-08-02 DIAGNOSIS — E11.3311: ICD-10-CM

## 2024-08-02 PROCEDURE — 67210 TREATMENT OF RETINAL LESION: CPT | Mod: 79,RT | Performed by: SPECIALIST

## 2024-08-02 ASSESSMENT — CONFRONTATIONAL VISUAL FIELD TEST (CVF)
OS_FINDINGS: FULL
OD_FINDINGS: FULL

## 2024-09-17 ENCOUNTER — EMERGENCY (EMERGENCY)
Facility: HOSPITAL | Age: 59
LOS: 1 days | Discharge: HOME | End: 2024-09-17
Attending: EMERGENCY MEDICINE | Admitting: EMERGENCY MEDICINE
Payer: COMMERCIAL

## 2024-09-17 VITALS
HEART RATE: 91 BPM | TEMPERATURE: 98 F | DIASTOLIC BLOOD PRESSURE: 82 MMHG | OXYGEN SATURATION: 99 % | RESPIRATION RATE: 16 BRPM | WEIGHT: 266.98 LBS | HEIGHT: 68 IN | SYSTOLIC BLOOD PRESSURE: 126 MMHG

## 2024-09-17 DIAGNOSIS — Z09 ENCOUNTER FOR FOLLOW-UP EXAMINATION AFTER COMPLETED TREATMENT FOR CONDITIONS OTHER THAN MALIGNANT NEOPLASM: Chronic | ICD-10-CM

## 2024-09-17 DIAGNOSIS — Z98.89 OTHER SPECIFIED POSTPROCEDURAL STATES: Chronic | ICD-10-CM

## 2024-09-17 DIAGNOSIS — Z96.659 PRESENCE OF UNSPECIFIED ARTIFICIAL KNEE JOINT: Chronic | ICD-10-CM

## 2024-09-17 PROCEDURE — 73590 X-RAY EXAM OF LOWER LEG: CPT | Mod: 26,LT

## 2024-09-17 PROCEDURE — 73610 X-RAY EXAM OF ANKLE: CPT | Mod: 26,LT

## 2024-09-17 PROCEDURE — 96372 THER/PROPH/DIAG INJ SC/IM: CPT

## 2024-09-17 PROCEDURE — 73610 X-RAY EXAM OF ANKLE: CPT

## 2024-09-17 PROCEDURE — 73620 X-RAY EXAM OF FOOT: CPT

## 2024-09-17 PROCEDURE — 73620 X-RAY EXAM OF FOOT: CPT | Mod: 26,LT

## 2024-09-17 PROCEDURE — 99284 EMERGENCY DEPT VISIT MOD MDM: CPT | Mod: 25

## 2024-09-17 PROCEDURE — 73590 X-RAY EXAM OF LOWER LEG: CPT

## 2024-09-17 PROCEDURE — 99284 EMERGENCY DEPT VISIT MOD MDM: CPT

## 2024-09-17 RX ORDER — IBUPROFEN 600 MG
1 TABLET ORAL
Qty: 20 | Refills: 0
Start: 2024-09-17 | End: 2024-09-21

## 2024-09-17 RX ORDER — OXYCODONE HYDROCHLORIDE 5 MG/1
1 TABLET ORAL
Qty: 12 | Refills: 0
Start: 2024-09-17 | End: 2024-09-19

## 2024-09-17 RX ORDER — LIDOCAINE/BENZALKONIUM/ALCOHOL
1 SOLUTION, NON-ORAL TOPICAL ONCE
Refills: 0 | Status: COMPLETED | OUTPATIENT
Start: 2024-09-17 | End: 2024-09-17

## 2024-09-17 RX ORDER — KETOROLAC TROMETHAMINE 30 MG/ML
30 INJECTION, SOLUTION INTRAMUSCULAR ONCE
Refills: 0 | Status: DISCONTINUED | OUTPATIENT
Start: 2024-09-17 | End: 2024-09-17

## 2024-09-17 RX ADMIN — Medication 1 PATCH: at 21:37

## 2024-09-17 RX ADMIN — KETOROLAC TROMETHAMINE 30 MILLIGRAM(S): 30 INJECTION, SOLUTION INTRAMUSCULAR at 21:57

## 2024-09-17 NOTE — ED ADULT NURSE NOTE - NSICDXPASTMEDICALHX_GEN_ALL_CORE_FT
PAST MEDICAL HISTORY:  Diabetes rarely does acucheck  100-140    HTN (hypertension)     Knee pain, right     Osteoarthritis knees    Sleep apnea in adult sleep study done 5 years ago.  DOES NOT USE CPAP.

## 2024-09-17 NOTE — ED PROVIDER NOTE - PATIENT PORTAL LINK FT
You can access the FollowMyHealth Patient Portal offered by [facility] by registering at the following website: [email or web address]. By joining [employer]’s FollowMyHealth portal, you will also be able to view your health information using other applications (apps) compatible with our system.

## 2024-09-17 NOTE — ED ADULT NURSE NOTE - OBJECTIVE STATEMENT
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 Patient states his left ankle got caught while the transporter was still moving. Left ankle presents as swollen and patient states pain is a 7/10 at this time without movement and with palpation

## 2024-09-17 NOTE — ED PROVIDER NOTE - PROGRESS NOTE DETAILS
SERGEI Corley: All results reviewed with patient and his wife at bedside. Patient was informed the xray reading was a prelim, if there are discrepancies with the final reading they will receive a phone call.    X-ray images reviewed no fractures noted.  Ankle was Ace wrapped patient was given crutches.  Will DC with prescription of pain meds and patient has his orthopedic doctor that he wants to follow-up with.

## 2024-09-17 NOTE — ED PROVIDER NOTE - PHYSICAL EXAMINATION
Gen: Well appearing in NAD.   Head: atraumatic  Msk: +Tenderness to palpation over the left medial lateral and anterior ankle with swelling and mild bruising.  Limited range of motion secondary to pain.  2+ pedal pulses, good cap refill in the toes.  Patient moving all toes.  No neurovascular compromise on exam.  No tenderness to the left knee or proximal tibia-fibula, full range of motion of the knee and hip.   Neuro: AAO x3, patient moving all extremity equally,  Skin: Normal for race. No open wounds   Psych: Calm and cooperative Gen: Well appearing in NAD.   Head: atraumatic  Msk: +Tenderness to palpation over the left medial lateral and anterior ankle with swelling and mild bruising.  No Foot TTP,  Limited range of motion secondary to pain.  2+ pedal pulses, good cap refill in the toes.  Patient moving all toes.  No neurovascular compromise on exam.  No tenderness to the left knee or proximal tibia-fibula, full range of motion of the knee and hip.   Neuro: AAO x3, patient moving all extremity equally,  Skin: Normal for race. No open wounds   Psych: Calm and cooperative

## 2024-09-17 NOTE — ED PROVIDER NOTE - NSICDXFAMILYHX_GEN_ALL_CORE_FT
FAMILY HISTORY:  Father  Still living? No  Family history of arrhythmia, Age at diagnosis: Age Unknown  Family history of hypertension, Age at diagnosis: Age Unknown    Mother  Still living? No  Family history of diabetes mellitus, Age at diagnosis: Age Unknown

## 2024-09-17 NOTE — ED ADULT NURSE NOTE - NSICDXPASTSURGICALHX_GEN_ALL_CORE_FT
PAST SURGICAL HISTORY:  S/P knee surgery right knee arthroscopy    S/P total knee replacement 2016, partial, right    S/P umbilical hernia repair, follow-up exam

## 2024-09-17 NOTE — ED PROVIDER NOTE - NSFOLLOWUPINSTRUCTIONS_ED_ALL_ED_FT
Rest, Ice 15 min on/ 15 min off, Elevate and keep compression of affected area.     Take the prescribed oxycodone as directed for severe pain.  You may take your over-the-counter Tylenol and the prescribed ibuprofen as directed for mild to moderate pain.    Use the crutches as directed in the ER    Follow up with Orthopedic doctor within 1 week.       Return for worsening symptoms

## 2024-09-17 NOTE — ED ADULT TRIAGE NOTE - NS ED TRIAGE CLINICAL UPGRADE
Conjuntivae and eyelids appear normal,  Sclerae : White without injection Pain - Patient was clinically upgraded due to pain.

## 2024-09-17 NOTE — ED ADULT NURSE NOTE - NSFALLHARMRISKINTERV_ED_ALL_ED

## 2024-09-17 NOTE — ED PROVIDER NOTE - CLINICAL SUMMARY MEDICAL DECISION MAKING FREE TEXT BOX
59-year-old male sustained left ankle injury while at work, on examination patient has swelling and tenderness over the medial and lateral malleolus, patient able to flex and extend foot, positive dorsal pedal pulse, cap refill less than 2 seconds, will follow-up x-rays, pain control.

## 2024-09-17 NOTE — ED ADULT NURSE NOTE - IS THE PATIENT ABLE TO BE SCREENED?
Discussed with patient in appointment.  Abnormal EKG.  No previous comparison.  Due to symptoms ordered Lexiscan as patient cannot exercise Yes

## 2024-09-17 NOTE — ED PROVIDER NOTE - OBJECTIVE STATEMENT
59-year-old male past medical history of diabetes, hypertension, high cholesterol, CABG on aspirin was brought in by EMS with complaint of left ankle pain status post injury while at work.  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  Reports he has not been able to bear weight since.  He denies any other injuries or paresthesias to the leg 59-year-old male past medical history of knee replacements, diabetes, hypertension, high cholesterol, CABG on aspirin was brought in by EMS with complaint of left ankle pain status post injury while at work.  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  Reports he has not been able to bear weight since.  He denies any other injuries or paresthesias to the leg

## 2024-09-17 NOTE — ED ADULT NURSE REASSESSMENT NOTE - NS ED NURSE REASSESS COMMENT FT1
Patient medicated for 7/10 pain per MD order. Patient safely transported to X-RAY by transport team at this time. All safety maintained

## 2024-09-17 NOTE — ED ADULT NURSE NOTE - NSFALLRISKFACTORS_ED_ALL_ED
Injury to left ankle/Bone Condition: Including osteoporosis, prolonged steroid use or metastatic bone disease/cancer

## 2024-09-17 NOTE — ED PROVIDER NOTE - CARE PROVIDER_API CALL
Farzad Corral  Orthopaedic Surgery  [address] [place] [id number]  Phone: [phone]  Fax: [phone]  Follow Up Time:

## 2024-09-17 NOTE — ED PROVIDER NOTE - WR ORDER NAME 3
HR=54 bpm, OVPT=485/66 mmhg, SpO2=99.0 %, Resp=5 B/min, EtCO2=39 mmHg, Apnea=6 Seconds Xray Ankle Complete 3 Views, Left

## 2024-09-17 NOTE — ED ADULT NURSE NOTE - HOW OFTEN DO YOU HAVE A DRINK CONTAINING ALCOHOL?
Show Spray Paint Technique Variable?: Yes Consent: The patient's consent was obtained including but not limited to risks of crusting, scabbing, blistering, scarring, darker or lighter pigmentary change, recurrence, incomplete removal and infection. Render Post-Care Instructions In Note?: no Post-Care Instructions: I reviewed with the patient in detail post-care instructions. Patient is to wear sunprotection, and avoid picking at any of the treated lesions. Pt may apply Vaseline to crusted or scabbing areas. Detail Level: Detailed Spray Paint Text: The liquid nitrogen was applied to the skin utilizing a spray paint frosting technique. Medical Necessity Clause: This procedure was medically necessary because the lesions that were treated were: Medical Necessity Information: It is in your best interest to select a reason for this procedure from the list below. All of these items fulfill various CMS LCD requirements except the new and changing color options. Never

## 2024-09-17 NOTE — ED ADULT TRIAGE NOTE - CHIEF COMPLAINT QUOTE
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

## 2024-09-18 PROBLEM — M19.90 UNSPECIFIED OSTEOARTHRITIS, UNSPECIFIED SITE: Chronic | Status: ACTIVE | Noted: 2018-05-23

## 2024-11-01 ENCOUNTER — OFFICE (OUTPATIENT)
Dept: URBAN - METROPOLITAN AREA CLINIC 63 | Facility: CLINIC | Age: 59
Setting detail: OPHTHALMOLOGY
End: 2024-11-01
Payer: COMMERCIAL

## 2024-11-01 DIAGNOSIS — Z79.4: ICD-10-CM

## 2024-11-01 DIAGNOSIS — E11.3313: ICD-10-CM

## 2024-11-01 DIAGNOSIS — E11.3312: ICD-10-CM

## 2024-11-01 PROCEDURE — 92012 INTRM OPH EXAM EST PATIENT: CPT | Mod: 57 | Performed by: SPECIALIST

## 2024-11-01 PROCEDURE — 67210 TREATMENT OF RETINAL LESION: CPT | Mod: LT | Performed by: SPECIALIST

## 2024-11-01 PROCEDURE — 92134 CPTRZ OPH DX IMG PST SGM RTA: CPT | Performed by: SPECIALIST

## 2024-11-01 PROCEDURE — 92235 FLUORESCEIN ANGRPH MLTIFRAME: CPT | Performed by: SPECIALIST

## 2024-11-01 ASSESSMENT — VISUAL ACUITY
OD_BCVA: 20/20
OS_BCVA: 20/25-1

## 2024-11-01 ASSESSMENT — TONOMETRY
OS_IOP_MMHG: 18
OD_IOP_MMHG: 18

## 2024-12-09 ENCOUNTER — OFFICE (OUTPATIENT)
Dept: URBAN - METROPOLITAN AREA CLINIC 63 | Facility: CLINIC | Age: 59
Setting detail: OPHTHALMOLOGY
End: 2024-12-09
Payer: COMMERCIAL

## 2024-12-09 DIAGNOSIS — E11.3312: ICD-10-CM

## 2024-12-09 DIAGNOSIS — H25.813: ICD-10-CM

## 2024-12-09 DIAGNOSIS — E11.3311: ICD-10-CM

## 2024-12-09 PROCEDURE — 99213 OFFICE O/P EST LOW 20 MIN: CPT | Mod: 24 | Performed by: INTERNAL MEDICINE

## 2024-12-09 ASSESSMENT — CONFRONTATIONAL VISUAL FIELD TEST (CVF)
OD_FINDINGS: FULL
OS_FINDINGS: FULL

## 2024-12-09 ASSESSMENT — TONOMETRY
OD_IOP_MMHG: 17
OS_IOP_MMHG: 17

## 2024-12-11 ASSESSMENT — KERATOMETRY
OS_K1POWER_DIOPTERS: 42.25
OS_K2POWER_DIOPTERS: 42.75
OD_AXISANGLE_DEGREES: 078
OD_K1POWER_DIOPTERS: 42.25
OS_AXISANGLE_DEGREES: 088
OD_K2POWER_DIOPTERS: 43.00

## 2024-12-11 ASSESSMENT — REFRACTION_AUTOREFRACTION
OD_AXIS: 045
OS_CYLINDER: -1.00
OD_SPHERE: 0.00
OD_CYLINDER: -0.50
OS_AXIS: 103
OS_SPHERE: +0.25

## 2024-12-11 ASSESSMENT — VISUAL ACUITY
OD_BCVA: 20/20
OS_BCVA: 20/25

## 2024-12-18 ENCOUNTER — OFFICE (OUTPATIENT)
Dept: URBAN - METROPOLITAN AREA CLINIC 63 | Facility: CLINIC | Age: 59
Setting detail: OPHTHALMOLOGY
End: 2024-12-18
Payer: COMMERCIAL

## 2024-12-18 DIAGNOSIS — E11.3311: ICD-10-CM

## 2024-12-18 PROBLEM — H25.813 COMBINED FORMS AGE RELATED CATARACT; BOTH EYES: Status: ACTIVE | Noted: 2024-12-09

## 2024-12-18 PROCEDURE — 67210 TREATMENT OF RETINAL LESION: CPT | Mod: 79,RT | Performed by: SPECIALIST

## 2024-12-18 ASSESSMENT — KERATOMETRY
OS_K1POWER_DIOPTERS: 42.25
OD_AXISANGLE_DEGREES: 078
OS_AXISANGLE_DEGREES: 088
OD_K1POWER_DIOPTERS: 42.25
OS_K2POWER_DIOPTERS: 42.75
OD_K2POWER_DIOPTERS: 43.00

## 2024-12-18 ASSESSMENT — REFRACTION_AUTOREFRACTION
OD_SPHERE: 0.00
OS_SPHERE: +0.25
OS_CYLINDER: -1.00
OD_AXIS: 045
OD_CYLINDER: -0.50
OS_AXIS: 103

## 2024-12-18 ASSESSMENT — CONFRONTATIONAL VISUAL FIELD TEST (CVF)
OD_FINDINGS: FULL
OS_FINDINGS: FULL

## 2024-12-18 ASSESSMENT — VISUAL ACUITY
OD_BCVA: 20/20
OS_BCVA: 20/20

## 2024-12-18 ASSESSMENT — TONOMETRY
OD_IOP_MMHG: 16
OS_IOP_MMHG: 15

## 2025-05-07 NOTE — PRE-OP CHECKLIST - STERILIZATION AFFIRMATION
[facility] Risk Assessment Scale    Patient Name: Armen Ochoa  YOB: 1949  MRN: [id number]            RIsk Factor Measure Recommendation Patient Data Scale/Score   BMI >40 Reconsider surgery, weight loss   Estimated body mass index is 23.33 kg/m² as calculated from the following:    Height as of 5/6/25: 6' (1.829 m).    Weight as of 5/6/25: 78 kg (172 lb).   [x] 0 = 1 - 24.9  [] 1 = 25-29.9  [] 2 = 30-34.9  [] 3 = 35-39.9  [] 4 = 40-44.9  [] 5 = 45-99.9   Hemoglobin AIC (if applicable) >9 Delay surgery until DM under control  Refer for:  Nutrition Therapy  Exercise   Medication    Lab Results   Component Value Date    HGBA1C 8.4 (H) 05/25/2021       Lab Results   Component Value Date     (H) 05/25/2021      [] 0 = 4.0-5.6  [] 1 = 5.7-6.4  [] 2 = 6.5-6.9  [] 3 = 7.0-7.9  [x] 4 = 8.0-8.9  [] 5 = 9.0-12   Hemoglobin (Anemia) <9 Delay surgery   Correct anemia Lab Results   Component Value Date    HGB 11.9 (L) 05/25/2021    [] 20 - <9.0                    Albumin <3 Delay surgery &Workup Lab Results   Component Value Date    ALBUMIN 3.9 05/10/2021    [] 20 - <3.0   Smoking Cessation >4 Weeks Delay Surgery  Refer to OP Cessation Class    Former Smoker [] 20 - current smoker                                _____ PPD                    Hx of MI, PE, Arrhythmia, CVA, DVT <30 Days Delay Surgery    N/A [] 20      Infection Variable Delay surgery and re-evaluate   N/A [] 20 - recent/current infection     Depression (PHQ) >10 out of 27 Delay Surgery and re-evaluate  Medication  Counseling              [x] 0     []1     []2     []3      []4      [] 5                    (1-4)      (5-9)  (10-14)  (15-19)   (20-27)     Memory Impairment & Memory loss (Mini-Cog Screening Tool) Advanced dementia and/or Parkinson's Reconsider surgery     [x] 0     []1     []2     []3     []4     [] 5     Physical Conditioning (Modified AM-PAC Per Physical Therapy at [facility]) Unable to ambulate on day of surgery Delay  surgery and re-evaluate  Pre-Rehabilitation   (PT evaluation)       []  0   [x]4       []8     []12        []16     []20       (<20%)   (<40%)   (<60%)   (<80% )    (>80%)     Home Environment/Caregiver support  (Per /Navigator Interview)    Availability of basic services and/or approprate assistance during post-operative period Delay surgery and re-evaluate  Safe home environment  Health   1 week post-surgery  Transportation  availability  Ability to obtain DME/Medications post-op    [x] 0     []1     []2     []3     []4     [] 5  [x] 0     []1     []2     []3     []4     [] 5  [x] 0     []1     []2     []3     []4     [] 5  [x] 0     []1     []2     []3     []4     [] 5         MD Contact: Dr. Braxton Comments:  Total Score:  8       n/a